# Patient Record
Sex: FEMALE | Race: WHITE | Employment: FULL TIME | URBAN - METROPOLITAN AREA
[De-identification: names, ages, dates, MRNs, and addresses within clinical notes are randomized per-mention and may not be internally consistent; named-entity substitution may affect disease eponyms.]

---

## 2017-08-04 ENCOUNTER — HOSPITAL ENCOUNTER (OUTPATIENT)
Dept: NUTRITION | Age: 42
Discharge: HOME | End: 2017-08-04
Payer: COMMERCIAL

## 2017-08-04 PROCEDURE — 97802 MEDICAL NUTRITION INDIV IN: CPT

## 2017-08-04 NOTE — PROGRESS NOTES
Kemi Biswas [facility]  [address] [facility], [place], [place]  Phone: [phone]  Fax: [phone]   Nutrition Assessment  Medical Nutrition Therapy   Outpatient Initial Evaluation         Patient Name: Daniela Michelle : 1975   Treatment Diagnosis: Wt gain Onset Date: Since child birth   Referral Source: Stevenson Snow MD Start of [facility], [place], operated [facility]): 2017     Ht:  64 in  cm Wt:  153.4 lb  kg   BMI: 26.3  BMR   male  BMR female 1341     PMHx includes: Wt gain, HLD, depression, anxiety     Medications of Nutritional Significance:   Welbutrin     Subjective/Assessment:   41 yo female referred for help with wt loss. Pt reports trying 10+ fad diets in the past and wt fluctuating from 130-163#. She's feeling hopeless regarding wt loss and has stopped weighing herself because it was becoming defeating. She enjoys lifting wts; currently 3x/wk (goal: 3-5x/wk) but does not like to go to the gym by herself. She realizes that wt may not decrease as much when building muscle. She has time to walk, when her daughters are at [employer] practice, but she wasn't sure if that was helpful. Educated pt on balance and moderation, adjusted her MFP settings (she states she's obsessed with it and logs everything she puts into her mouth). Pt may have orthorexic tendencies due to dieting history & is still potentially restricting carbohydrates more than necessary. She is motivated to find a strategy that works long term for gradual wt loss, she verbalized understanding recommendations and compliance is expected. Current Eating Patterns: Has not been following any specific diets for 1-2 mo; focusing on high protein and not weighing herself prior to apmt.  Uses MFP was at 6233-1981 calories 50% pro, 25% fat - decreased in office to 7991-3387 & 30/40/30%    7am: Protein shake 25g pro, 4g carb, 1.5g fat  8am: B - 2 boiled eggs, avocado or oatmeal  10am: S - nuts  11:30am: L - leftovers (meat, starch, veg)  2pm: S - sometimes (rice cake and PB)  4:30pm: D - chicken, rice, veg  Workout  Protein shake 25g pro, 4g carb, 1.5g fat  Sometimes will make popcorn (weekends)     Handouts/  Information Provided: [x]  Carbohydrates  [x]  Protein  []  Fiber  []  Serving Sizes  [x]  Meal and Snack Ideas  []  Food Journals []  Diabetes  []  Cholesterol  []  Sodium  []  Gen Nutr Guidelines  []  SBGM Guidelines  [x]  Others: Hunger scale, balanced plate model, meal timing guidelines   Estimate Needs:   Calories: 8939-7158 Protein:  Carbs: 150-170 Fat: 50   Kcal/day  g/day  g/day  g/day        percent: 25-30  40-45  30     Nutritional Goal - To promote lifestyle changes to result in:    [x]  Weight loss  []  Improved diabetic control  []  Decreased cholesterol levels  []  Decreased blood pressure  []  Weight maintenance []  Preventing any interactions associated with food allergies  []  Adequate weight gain toward goal weight  []  Other:      Recommendations: 1. Start increasing portion size and frequency of vegetable intake. 2. Pay more attention to hunger and fullness signals. 3. Become more mindful of overall balance and start including a carbohydrate source after lifting. 4. Try to pair protein with carbohydrates. Information Reviewed with: pt   Potential Barriers to Learning:  Many previous dieting rules are still engraved in her mind; it may take time to retrain the way she thinks about food     Dietitian Signature: Harvinder Weeks MS, RD Date: 8/4/2017   Follow-up: Mon. 9/25/17 at 9:30a Time: 11:36 AM

## 2017-09-25 ENCOUNTER — HOSPITAL ENCOUNTER (OUTPATIENT)
Dept: NUTRITION | Age: 42
Discharge: HOME | End: 2017-09-25
Payer: COMMERCIAL

## 2017-09-25 PROCEDURE — 97803 MED NUTRITION INDIV SUBSEQ: CPT

## 2017-09-25 NOTE — PROGRESS NOTES
NUTRITION  DAILY TREATMENT NOTE  Patient Name: Krishna Moreno         Date: 2017  : 1975    YES Patient  Verified  Insurance: Payor: BLUE CROSS / Plan: [place] [employer] / Product Type: PPO /    Diagnosis: In time:   930             Out time:   1000   Total Treatment Time (min):   30     SUBJECTIVE    Medication Changes/New allergies or changes in medical history, any new surgeries or procedures? NO    If yes, update Summary List   Subjective Functional Status/Changes:  []  No changes reported   Pt has been doing okay since last visit. She states she does really good during the week when she has structure and routine, but things kind of fall apart (diet wise) on the weekends. She is \"addicted\" to MFP and logs everything, adjusted her macros and calories to give er a better balance to elisit fat burn. Pt prefers weight training, but has been unable to get to the gym more than 1-2 times per week lately. She does walk 3-5 miles when at [facility]. Provided her with other exercises she can do to create a circuit workout when she cannot make it to the gym. Discussed meal prep on weekends or week days she has the time in order to allow for healthier choices. Also discussed comparison photos since she feels she is not seeing any change with her body comp. Pt does have some high expectations and continued to reassure that it takes time. Anticipate compliance.     Current Wt: 155.2 Previous Wt: 153.4 Wt Change: +2.8     OBJECTIVE    Patient Education:  [x]  Review current plan with patient   []  Other:    Handouts/  Information Provided: []  Carbohydrates  []  Protein  []  Fiber  []  Serving Sizes  []  Fluids  []  General guidelines []  Diabetes  []  Cholesterol  []  Sodium  []  SBGM  []  Food Journals  []  Others:    Estimated Needs: 9638 164 313 95    Calories Protein CHO Fat     ASSESSMENT    []  Pt Progressing Well  [x]  Slow Progress []  No Progress    Other:    [x]  Understand Dietary Changes/Recs [x]  No Change []  Improving []  N/A   [x]  Weight [x]  No Change []  Improving []  N/A     Glucose Levels []  No Change []  Improving []  N/A   []  Cholesterol Levels []  No Change []  Improving []  N/A     RECOMMENDATIONS    1. Use crock pot or meal prep on days that work best for your schedule; at least make meat/veggies   2. Exercise ideas: tababata timer, sparticus workout   3. Adjusted nutrient breakdown slightly to better balance carb/protein ratio   4.    5.       PLAN    [x]  Continue on current plan []  Follow-up PRN   []  Discharge due to :    [x]  Next Appt[de-identified] 27 OCt @ 4502     Dietitian: Junior Mead MA, RD    Date: 9/25/2017 Time: 9:37 AM

## 2017-10-27 ENCOUNTER — APPOINTMENT (OUTPATIENT)
Dept: NUTRITION | Age: 42
End: 2017-10-27

## 2017-12-04 ENCOUNTER — HOSPITAL ENCOUNTER (OUTPATIENT)
Dept: NUTRITION | Age: 42
Discharge: HOME | End: 2017-12-04
Payer: COMMERCIAL

## 2017-12-04 PROCEDURE — 97803 MED NUTRITION INDIV SUBSEQ: CPT

## 2017-12-04 NOTE — PROGRESS NOTES
NUTRITION  FOLLOW-UP TREATMENT NOTE  Patient Name: Ana Pro         Date: 2017  : 1975    YES Patient  Verified  Diagnosis: Weight loss    In time:  1500            Out time:   1530   Total Treatment Time (min):   30     SUBJECTIVE/ASSESSMENT  Changes in medication or medical history? Any new allergies, surgeries or procedures? NO    If yes, update Summary List   Pt states it was a rough two months and she fell off track in her exercise, healthy eating, and recording in MFP. She wants to be healthy and get back on track. Discussed obstacles and ways to keep motivated during fat loss goals. She continues to have high expectations and needed reassurance that it takes time for changes to occur. Reviewed calorie/macro ranges. Pt will benefit from follow up in keeping her accountable. Current Wt: 160.2 Previous Wt: 155. 2 Wt Change: +5 lbs      Achievement of Goals:  1. Use crock pot or meal prep on days that work best for your schedule; at least make meat/veggies. 2. Exercise ideas: tababata timer, sparticus workout  3. Adjusted nutrient breakdown slightly to better balance carb/protein ratio  New Patient Goals:  1. Exercise 3-5 days/week for 20-30 min cardio, include HIIT or circuit training  2. Remain consistent in meal timing and eating balanced meals during the week and weekend. 3. Continue tracking calories on MFP. Patient Education:  [x]  Review current plan with patient   []  Other:    Handouts/  Information Provided: []  Carbohydrates  []  Protein  []  Fiber  []  Serving Sizes  []  Fluids  []  General guidelines []  Diabetes  []  Cholesterol  []  Sodium  []  SBGM  []  Food Journals  []  Others:      PLAN  [x]  Continue on current plan []  Follow-up PRN   []  Discharge due to :    [x]  Next appt:   5 at 0930     Dietitian: Sam Khanna RDN    Date: 2017 Time: 3:46 PM

## 2018-02-05 ENCOUNTER — HOSPITAL ENCOUNTER (OUTPATIENT)
Dept: NUTRITION | Age: 43
Discharge: HOME | End: 2018-02-05
Payer: COMMERCIAL

## 2018-02-05 PROCEDURE — 97803 MED NUTRITION INDIV SUBSEQ: CPT

## 2018-02-05 NOTE — PROGRESS NOTES
NUTRITION  FOLLOW-UP TREATMENT NOTE  Patient Name: Kj Titus         Date: 2018  : 1975    YES Patient  Verified  Diagnosis: Overweight    In time:   0930             Out time:   1000   Total Treatment Time (min):   30     SUBJECTIVE/ASSESSMENT  Changes in medication or medical history? Any new allergies, surgeries or procedures? NO    If yes, update Summary List   Pt doing well since last visit and being more consistent with tracking in MFP and weight lifting 3-5 times/week. She is training for a fitness competition in May. For the past 3 weeks, pt has cut out meat from her diet (pork, beef, chicken, turkey), states she's experimenting with her body and feels good. She is including seafood, eggs, cheese, beans, and rice protein powder. She finds it challenging to balance her CHO:protein, finding it easier to consume more CHO, especially with her vegetarian protein foods (soy, beans). Pt remains hyper-focused on her food intake and meeting her macro goals. Discussed having a healthy relationship with food versus having strict guidelines, whereas pt feels shame if these aren't met. Will continue to follow after two months as pt has need for accountability. Current Wt: 165.2 Previous Wt: 160.2 Wt Change: +5     Achievement of Goals: 1. Exercise 3-5 days/week for 20-30 min cardio, include HIIT or circuit training=continue  2. Remain consistent in meal timing and eating balanced meals during the week and weekend.=continue  3. Continue tracking calories on MFP.=continue  New Patient Goals:  1. Add in vegetables at lunch/dinner.      Patient Education:  [x]  Review current plan with patient   []  Other:    Handouts/  Information Provided: []  Carbohydrates  []  Protein  []  Fiber  []  Serving Sizes  []  Fluids  []  General guidelines []  Diabetes  []  Cholesterol  []  Sodium  []  SBGM  []  Food Journals  []  Others:      PLAN  []  Continue on current plan []  Follow-up PRN   []  Discharge due to : [x]  Next appt: April 20 at 1500     Dietitian: Jeramy Lemons RDN    Date: 2/5/2018 Time: 12:30 PM

## 2018-04-20 ENCOUNTER — HOSPITAL ENCOUNTER (OUTPATIENT)
Dept: NUTRITION | Age: 43
Discharge: HOME | End: 2018-04-20
Payer: COMMERCIAL

## 2018-04-20 PROCEDURE — 97803 MED NUTRITION INDIV SUBSEQ: CPT

## 2018-04-20 NOTE — PROGRESS NOTES
NUTRITION  FOLLOW-UP TREATMENT NOTE  Patient Name: Bro Dobson         Date: 2018  : 1975    YES Patient  Verified  Diagnosis: Overweight   In time:   1500             Out time:   1530   Total Treatment Time (min):   30     SUBJECTIVE/ASSESSMENT  Changes in medication or medical history? Any new allergies, surgeries or procedures? NO    If yes, update Summary List    Pt reports continuing to track her food/fluid intake in [facility]. She is focusing on weight training, but does incorporate aerobic activity during her workouts. Her main goals are to build muscle and burn body fat, particularly around her mid-section. She is frustrated by her lack of noticeable body changes and thinks she should continue to cut calories. Discussed caloric range to meet nutrient needs and build muscle without causing nutrient deficiency. Per Pt's physical fitness goals, she may be better served by meeting with one of our sports dietitians. Will follow up with fellow dietitian and Pt. Current Wt: 160.6 Previous Wt: 165.2 Wt Change: -5     Achievement of Goals: 1. Add in vegetables at lunch/dinner.=met  New Patient Goals:  1. Continue to track food intake in [facility].       Patient Education:  [x]  Review current plan with patient   []  Other:    Handouts/  Information Provided: []  Carbohydrates  []  Protein  []  Fiber  []  Serving Sizes  []  Fluids  []  General guidelines []  Diabetes  []  Cholesterol  []  Sodium  []  SBGM  []  Food Journals  []  Others:      PLAN  []  Continue on current plan [x]  Follow-up PRN   []  Discharge due to :    []  Next appt:      Dietitian: Brittany Nathan RDN    Date: 2018 Time: 3:53 PM

## 2018-04-23 ENCOUNTER — OFFICE VISIT (OUTPATIENT)
Dept: ORTHOPEDIC SURGERY | Age: 43
End: 2018-04-23

## 2018-04-23 VITALS
SYSTOLIC BLOOD PRESSURE: 115 MMHG | OXYGEN SATURATION: 98 % | DIASTOLIC BLOOD PRESSURE: 76 MMHG | BODY MASS INDEX: 27.72 KG/M2 | HEART RATE: 81 BPM | HEIGHT: 64 IN | WEIGHT: 162.4 LBS

## 2018-04-23 DIAGNOSIS — M72.2 PLANTAR FASCIITIS: ICD-10-CM

## 2018-04-23 DIAGNOSIS — M79.671 ARCH PAIN OF RIGHT FOOT: Primary | ICD-10-CM

## 2018-04-23 RX ORDER — MELOXICAM 15 MG/1
TABLET ORAL
Qty: 30 TAB | Refills: 1 | Status: SHIPPED | OUTPATIENT
Start: 2018-04-23

## 2018-04-23 RX ORDER — FAMOTIDINE 20 MG/1
20 TABLET, FILM COATED ORAL 2 TIMES DAILY
Qty: 60 TAB | Refills: 4 | Status: CANCELLED | OUTPATIENT
Start: 2018-04-23 | End: 2018-09-20

## 2018-04-23 RX ORDER — OMEPRAZOLE 40 MG/1
40 CAPSULE, DELAYED RELEASE ORAL DAILY
Qty: 30 CAP | Refills: 2 | Status: SHIPPED | OUTPATIENT
Start: 2018-04-23

## 2018-04-23 RX ORDER — METHYLPREDNISOLONE 4 MG/1
TABLET ORAL
Qty: 1 DOSE PACK | Refills: 0 | Status: SHIPPED | OUTPATIENT
Start: 2018-04-23

## 2018-04-23 NOTE — PATIENT INSTRUCTIONS
Rx has been provided for Medrol Dose Pack - Please take this medication FIRST until completed. Once the you complete the Medrol Dose, skip one day and start taking Mobic 15 mg tabs, one tab per day. Please take with Prilosec  Dorsal night splint provided  Order provided for PT  Please completed stretching exercises  Roll right foot with frozen water bottle  Follow up in 2 weeks or sooner as needed         Plantar Fasciitis: Care Instructions  Your Care Instructions    Plantar fasciitis is pain and inflammation of the plantar fascia, the tissue at the bottom of your foot that connects the heel bone to the toes. The plantar fascia also supports the arch. If you strain the plantar fascia, it can develop small tears and cause heel pain when you stand or walk. Plantar fasciitis can be caused by running or other sports. It also may occur in people who are overweight or who have high arches or flat feet. You may get plantar fasciitis if you walk or stand for long periods, or have a tight Achilles tendon or calf muscles. You can improve your foot pain with rest and other care at home. It might take a few weeks to a few months for your foot to heal completely. Follow-up care is a key part of your treatment and safety. Be sure to make and go to all appointments, and call your doctor if you are having problems. It's also a good idea to know your test results and keep a list of the medicines you take. How can you care for yourself at home? · Rest your feet often. Reduce your activity to a level that lets you avoid pain. If possible, do not run or walk on hard surfaces. · Take pain medicines exactly as directed. ¨ If the doctor gave you a prescription medicine for pain, take it as prescribed. ¨ If you are not taking a prescription pain medicine, take an over-the-counter anti-inflammatory medicine for pain and swelling, such as ibuprofen (Advil, Motrin) or naproxen (Aleve).  Read and follow all instructions on the label. · Use ice massage to help with pain and swelling. You can use an ice cube or an ice cup several times a day. To make an ice cup, fill a paper cup with water and freeze it. Cut off the top of the cup until a half-inch of ice shows. Hold onto the remaining paper to use the cup. Rub the ice in small circles over the area for 5 to 7 minutes. · Contrast baths, which alternate hot and cold water, can also help reduce swelling. But because heat alone may make pain and swelling worse, end a contrast bath with a soak in cold water. · Wear a night splint if your doctor suggests it. A night splint holds your foot with the toes pointed up and the foot and ankle at a 90-degree angle. This position gives the bottom of your foot a constant, gentle stretch. · Do simple exercises such as calf stretches and towel stretches 2 to 3 times each day, especially when you first get up in the morning. These can help the plantar fascia become more flexible. They also make the muscles that support your arch stronger. Hold these stretches for 15 to 30 seconds per stretch. Repeat 2 to 4 times. ¨ Stand about 1 foot from a wall. Place the palms of both hands against the wall at chest level. Lean forward against the wall, keeping one leg with the knee straight and heel on the ground while bending the knee of the other leg. ¨ Sit down on the floor or a mat with your feet stretched in front of you. Roll up a towel lengthwise, and loop it over the ball of your foot. Holding the towel at both ends, gently pull the towel toward you to stretch your foot. · Wear shoes with good arch support. Athletic shoes or shoes with a well-cushioned sole are good choices. · Try heel cups or shoe inserts (orthotics) to help cushion your heel. You can buy these at many shoe stores. · Put on your shoes as soon as you get out of bed. Going barefoot or wearing slippers may make your pain worse. · Reach and stay at a good weight for your height.  This puts less strain on your feet. When should you call for help? Call your doctor now or seek immediate medical care if:  · You have heel pain with fever, redness, or warmth in your heel. · You cannot put weight on the sore foot. Watch closely for changes in your health, and be sure to contact your doctor if:  · You have numbness or tingling in your heel. · Your heel pain lasts more than 2 weeks. Where can you learn more? Go to http://siobhan-eliseo.info/. Ailyn Tran in the search box to learn more about \"Plantar Fasciitis: Care Instructions. \"  Current as of: March 21, 2017  Content Version: 11.4  © 3246-9367 [employer]. Care instructions adapted under license by [employer] (which disclaims liability or warranty for this information). If you have questions about a medical condition or this instruction, always ask your healthcare professional. [place] [address] any warranty or liability for your use of this information. Plantar Fasciitis: Exercises  Your Care Instructions  Here are some examples of typical rehabilitation exercises for your condition. Start each exercise slowly. Ease off the exercise if you start to have pain. Your doctor or physical therapist will tell you when you can start these exercises and which ones will work best for you. How to do the exercises  Towel stretch    1. Sit with your legs extended and knees straight. 2. Place a towel around your foot just under the toes. 3. Hold each end of the towel in each hand, with your hands above your knees. 4. Pull back with the towel so that your foot stretches toward you. 5. Hold the position for at least 15 to 30 seconds. 6. Repeat 2 to 4 times a session, up to 5 sessions a day. Calf stretch    This exercise stretches the muscles at the back of the lower leg (the calf) and the Achilles tendon. Do this exercise 3 or 4 times a day, 5 days a week.   1. Stand facing a wall with your hands on the wall at about eye level. Put the leg you want to stretch about a step behind your other leg. 2. Keeping your back heel on the floor, bend your front knee until you feel a stretch in the back leg. 3. Hold the stretch for 15 to 30 seconds. Repeat 2 to 4 times. Plantar fascia and calf stretch    Stretching the plantar fascia and calf muscles can increase flexibility and decrease heel pain. You can do this exercise several times each day and before and after activity. 1. Stand on a step as shown above. Be sure to hold on to the banister. 2. Slowly let your heels down over the edge of the step as you relax your calf muscles. You should feel a gentle stretch across the bottom of your foot and up the back of your leg to your knee. 3. Hold the stretch about 15 to 30 seconds, and then tighten your calf muscle a little to bring your heel back up to the level of the step. Repeat 2 to 4 times. Towel curls    Make this exercise more challenging by placing a weighted object, such as a soup can, on the other end of the towel. 1. While sitting, place your foot on a towel on the floor and scrunch the towel toward you with your toes. 2. Then, also using your toes, push the towel away from you. [place] pickups    1. Put marbles on the floor next to a cup.  2. Using your toes, try to lift the marbles up from the floor and put them in the cup. Follow-up care is a key part of your treatment and safety. Be sure to make and go to all appointments, and call your doctor if you are having problems. It's also a good idea to know your test results and keep a list of the medicines you take. Where can you learn more? Go to http://siobhan-eliseo.info/. Deloris Rashid in the search box to learn more about \"Plantar Fasciitis: Exercises. \"  Current as of: March 21, 2017  Content Version: 11.4  © 0156-7308 [employer].  Care instructions adapted under license by [employer] (which disclaims liability or warranty for this information). If you have questions about a medical condition or this instruction, always ask your healthcare professional. [address] any warranty or liability for your use of this information.

## 2018-04-23 NOTE — MR AVS SNAPSHOT
[address] [place] [address], [address] 200 [facility] 
[phone] Patient: Daniela Michelle MRN: [id number] :1975 Visit Information Date & Time Provider Department Dept. Phone Encounter #  
 2018  2:45 PM Obdulio Gonzalez, [address] [place] [address] [facility] [phone] [id number] Follow-up Instructions Return in about 2 weeks (around 2018) for follow up evaluation. Upcoming Health Maintenance Date Due DTaP/Tdap/Td series (1 - Tdap) 10/26/1995 PAP AKA CERVICAL CYTOLOGY 10/26/1995 Influenza Age 5 to Adult 2017 Allergies as of 2018  Review Complete On: 2018 By: Rita Lozano PA-C No Known Allergies Current Immunizations  Never Reviewed No immunizations on file. Not reviewed this visit You Were Diagnosed With   
  
 Codes Comments Arch pain of right foot    -  Primary ICD-10-CM: P57.407 ICD-9-CM: 729.5 Plantar fasciitis     ICD-10-CM: M72.2 ICD-9-CM: 728.71 Vitals BP Pulse Height(growth percentile) Weight(growth percentile) SpO2 BMI  
 115/76 (BP 1 Location: Left arm, BP Patient Position: Sitting) 81 5' 4\" (1.626 m) 162 lb 6.4 oz (73.7 kg) 98% 27.88 kg/m2 OB Status Smoking Status Having regular periods Never Smoker Vitals History BMI and BSA Data Body Mass Index Body Surface Area  
 27.88 kg/m 2 1.82 m 2 Preferred Pharmacy Pharmacy Name Phone [facility] [address], [address] [place] [address] [phone] Your Updated Medication List  
  
   
This list is accurate as of 18  3:32 PM.  Always use your most recent med list.  
  
  
  
  
 cyclobenzaprine 10 mg tablet Commonly known as:  FLEXERIL Take 0.5 Tabs by mouth three (3) times daily as needed for Muscle Spasm(s). FISH OIL 1,000 mg Cap Generic drug:  omega-3 fatty acids-vitamin e Take  by mouth. MULTIVITAMIN PO Take  by mouth. naproxen 500 mg tablet Commonly known as:  NAPROSYN Take 1 Tab by mouth two (2) times daily (with meals). VITAMIN D3 1,000 unit tablet Generic drug:  cholecalciferol Take  by mouth daily. We Performed the Following AMB POC XRAY, FOOT; COMPLETE, 3+ VIEW [84530 CPT(R)] AMB SUPPLY ORDER [[id number] Custom] Comments:  
 Dorsal night splint REFERRAL TO PHYSICAL THERAPY [SZT27 Custom] Comments:  
 Right plantar fasciitis 
eval and treat to include modalities 2-3X a week X 3-4 weeks Follow-up Instructions Return in about 2 weeks (around 5/7/2018) for follow up evaluation. Referral Information Referral ID Referred By Referred To  
  
 [id number] MYKEL DENNIS [address]   
   [address] [place] [address] [address] Phone: [phone] Fax: [phone] Visits Status Start Date End Date 1 New Request 4/23/18 4/23/19 If your referral has a status of pending review or denied, additional information will be sent to support the outcome of this decision. Patient Instructions Rx has been provided for Medrol Dose Pack - Please take this medication FIRST until completed. Once the you complete the Medrol Dose, skip one day and start taking Mobic 15 mg tabs, one tab per day. Please take with Prilosec Dorsal night splint provided Order provided for PT Please completed stretching exercises Roll right foot with frozen water bottle Follow up in 2 weeks or sooner as needed Plantar Fasciitis: Care Instructions Your Care Instructions Plantar fasciitis is pain and inflammation of the plantar fascia, the tissue at the bottom of your foot that connects the heel bone to the toes. The plantar fascia also supports the arch. If you strain the plantar fascia, it can develop small tears and cause heel pain when you stand or walk. Plantar fasciitis can be caused by running or other sports. It also may occur in people who are overweight or who have high arches or flat feet. You may get plantar fasciitis if you walk or stand for long periods, or have a tight Achilles tendon or calf muscles. You can improve your foot pain with rest and other care at home. It might take a few weeks to a few months for your foot to heal completely. Follow-up care is a key part of your treatment and safety. Be sure to make and go to all appointments, and call your doctor if you are having problems. It's also a good idea to know your test results and keep a list of the medicines you take. How can you care for yourself at home? · Rest your feet often. Reduce your activity to a level that lets you avoid pain. If possible, do not run or walk on hard surfaces. · Take pain medicines exactly as directed. ¨ If the doctor gave you a prescription medicine for pain, take it as prescribed. ¨ If you are not taking a prescription pain medicine, take an over-the-counter anti-inflammatory medicine for pain and swelling, such as ibuprofen (Advil, Motrin) or naproxen (Aleve). Read and follow all instructions on the label. · Use ice massage to help with pain and swelling. You can use an ice cube or an ice cup several times a day. To make an ice cup, fill a paper cup with water and freeze it. Cut off the top of the cup until a half-inch of ice shows. Hold onto the remaining paper to use the cup. Rub the ice in small circles over the area for 5 to 7 minutes. · Contrast baths, which alternate hot and cold water, can also help reduce swelling. But because heat alone may make pain and swelling worse, end a contrast bath with a soak in cold water. · Wear a night splint if your doctor suggests it. A night splint holds your foot with the toes pointed up and the foot and ankle at a 90-degree angle. This position gives the bottom of your foot a constant, gentle stretch. · Do simple exercises such as calf stretches and towel stretches 2 to 3 times each day, especially when you first get up in the morning. These can help the plantar fascia become more flexible. They also make the muscles that support your arch stronger. Hold these stretches for 15 to 30 seconds per stretch. Repeat 2 to 4 times. ¨ Stand about 1 foot from a wall. Place the palms of both hands against the wall at chest level. Lean forward against the wall, keeping one leg with the knee straight and heel on the ground while bending the knee of the other leg. ¨ Sit down on the floor or a mat with your feet stretched in front of you. Roll up a towel lengthwise, and loop it over the ball of your foot. Holding the towel at both ends, gently pull the towel toward you to stretch your foot. · Wear shoes with good arch support. Athletic shoes or shoes with a well-cushioned sole are good choices. · Try heel cups or shoe inserts (orthotics) to help cushion your heel. You can buy these at many shoe stores. · Put on your shoes as soon as you get out of bed. Going barefoot or wearing slippers may make your pain worse. · Reach and stay at a good weight for your height. This puts less strain on your feet. When should you call for help? Call your doctor now or seek immediate medical care if: 
· You have heel pain with fever, redness, or warmth in your heel. · You cannot put weight on the sore foot. Watch closely for changes in your health, and be sure to contact your doctor if: 
· You have numbness or tingling in your heel. · Your heel pain lasts more than 2 weeks. Where can you learn more? Go to http://siobhan-eliseo.info/. Yoana Dominique in the search box to learn more about \"Plantar Fasciitis: Care Instructions. \" Current as of: March 21, 2017 Content Version: 11.4 © 8466-0796 Vigno.  Care instructions adapted under license by [employer] (which disclaims liability or warranty for this information). If you have questions about a medical condition or this instruction, always ask your healthcare professional. [address] any warranty or liability for your use of this information. Plantar Fasciitis: Exercises Your Care Instructions Here are some examples of typical rehabilitation exercises for your condition. Start each exercise slowly. Ease off the exercise if you start to have pain. Your doctor or physical therapist will tell you when you can start these exercises and which ones will work best for you. How to do the exercises Towel stretch 1. Sit with your legs extended and knees straight. 2. Place a towel around your foot just under the toes. 3. Hold each end of the towel in each hand, with your hands above your knees. 4. Pull back with the towel so that your foot stretches toward you. 5. Hold the position for at least 15 to 30 seconds. 6. Repeat 2 to 4 times a session, up to 5 sessions a day. Calf stretch This exercise stretches the muscles at the back of the lower leg (the calf) and the Achilles tendon. Do this exercise 3 or 4 times a day, 5 days a week. 1. Stand facing a wall with your hands on the wall at about eye level. Put the leg you want to stretch about a step behind your other leg. 2. Keeping your back heel on the floor, bend your front knee until you feel a stretch in the back leg. 3. Hold the stretch for 15 to 30 seconds. Repeat 2 to 4 times. Plantar fascia and calf stretch Stretching the plantar fascia and calf muscles can increase flexibility and decrease heel pain. You can do this exercise several times each day and before and after activity. 1. Stand on a step as shown above. Be sure to hold on to the banister. 2. Slowly let your heels down over the edge of the step as you relax your calf muscles. You should feel a gentle stretch across the bottom of your foot and up the back of your leg to your knee. 3. Hold the stretch about 15 to 30 seconds, and then tighten your calf muscle a little to bring your heel back up to the level of the step. Repeat 2 to 4 times. Towel curls Make this exercise more challenging by placing a weighted object, such as a soup can, on the other end of the towel. 1. While sitting, place your foot on a towel on the floor and scrunch the towel toward you with your toes. 2. Then, also using your toes, push the towel away from you. [place] pickups 1. Put marbles on the floor next to a cup. 
2. Using your toes, try to lift the marbles up from the floor and put them in the cup. Follow-up care is a key part of your treatment and safety. Be sure to make and go to all appointments, and call your doctor if you are having problems. It's also a good idea to know your test results and keep a list of the medicines you take. Where can you learn more? Go to http://siobhan-eliseo.info/. Kiera Barba in the search box to learn more about \"Plantar Fasciitis: Exercises. \" Current as of: March 21, 2017 Content Version: 11.4 © 2764-6482 Healthwise, [employer]. Care instructions adapted under license by [employer] (which disclaims liability or warranty for this information). If you have questions about a medical condition or this instruction, always ask your healthcare professional. [place] [address] any warranty or liability for your use of this information. Introducing [facility]! [address] introduces Ziploop patient portal. Now you can access parts of your medical record, email your doctor's office, and request medication refills online. 1. In your internet browser, go to [email or web address] 2. Click on the First Time User? Click Here link in the Sign In box. You will see the New Member Sign Up page. 3. Enter your Ziploop Access Code exactly as it appears below.  You will not need to use this code after youve completed the sign-up process. If you do not sign up before the expiration date, you must request a new code. · [employer] Access Code: [email or web address] Expires: 7/12/2018 11:17 AM 
 
4. Enter the last four digits of your Social Security Number (xxxx) and Date of Birth (mm/dd/yyyy) as indicated and click Submit. You will be taken to the next sign-up page. 5. Create a Breezeworks ID. This will be your Breezeworks login ID and cannot be changed, so think of one that is secure and easy to remember. 6. Create a B[employer]orks password. You can change your password at any time. 7. Enter your Password Reset Question and Answer. This can be used at a later time if you forget your password. 8. Enter your e-mail address. You will receive e-mail notification when new information is available in [address]. 9. Click Sign Up. You can now view and download portions of your medical record. 10. Click the Download Summary menu link to download a portable copy of your medical information. If you have questions, please visit the Frequently Asked Questions section of the Breezeworks website. Remember, Breezeworks is NOT to be used for urgent needs. For medical emergencies, dial 911. Now available from your iPhone and Android! Please provide this summary of care documentation to your next provider. Your primary care clinician is listed as Heidi Summers. If you have any questions after today's visit, please call [phone].

## 2018-04-23 NOTE — PROCEDURES
X-rays of the right foot reveal no fracture, dislocation or subluxation. No osteolytic or osteoblastic lesion noted. No calcaneal spur. Some sclerosis noted to the posterior calcaneus in the area of Achilles'  Insertion.

## 2018-04-23 NOTE — PROGRESS NOTES
AMBULATORY PROGRESS NOTE      Patient: Miles Benjamin             MRN: [id number]     SSN: [id number] Body mass index is 27.88 kg/(m^2). YOB: 1975     AGE: 36 y.o. SEX: female    PCP: Willian Lazcano MD    IMPRESSION/DIAGNOSIS AND TREATMENT PLAN     DIAGNOSES  1. Arch pain of right foot    2. Plantar fasciitis        Orders Placed This Encounter    AMB SUPPLY ORDER    [54880] Foot Min 3V    REFERRAL TO PHYSICAL THERAPY    methylPREDNISolone (MEDROL DOSEPACK) 4 mg tablet    meloxicam (MOBIC) 15 mg tablet    omeprazole (PRILOSEC) 40 mg capsule      Miles Benjamin understands her diagnoses and the proposed plan. Plan:  Rx has been provided for Medrol Dose Pack - Please take this medication FIRST until completed. Once the you complete the Medrol Dose, skip one day and start taking Mobic 15 mg tabs, one tab per day. Please take with Prilosec  Dorsal night splint provided  Order provided for PT  Please completed stretching exercises  Roll right foot with frozen water bottle  Follow up in 2 weeks or sooner as needed. Patient declined cortisone injection at this time. If no improvement at [facility], she will consider injection. Patient has been advised that there are mix results with surgical plantar fascia release procedure. HPI AND EXAMINATION     Miles Benjamin IS A 36 y.o. female who presents to my outpatient office for a problem visit involving right foot pain. At last visit, I treated for left Achilles' tendinitis and prescribed Mobic 15 mg, referred to PT, and provided a HEP. Patient presents today for a new problem of pain in the arch of her right foot that started several weeks ago. Patient states that she has recently increased her activity in preparation for a 5k race she is planning to run with her daughter.  She states that she has pain first thing in the morning when she first steps down and the pain returns when she stands after being seated for an extended period of time. She denies any history of injury or trauma to the right foot. She has not tried any treatment for her right plantar fascia pain. ANKLE/FOOT RIGHT     Psychiatry: Alert, Oriented x 3; Speech normal in context and clarity,                                 Memory intact grossly, no involuntary movements - tremors, no dementia  Gait: normal  Tenderness: moderate tenderness to plantar medial fascia, no pain to central heel or along Achilles tendon insertion  Cutaneous: WNL,   Joint Motion: WNL  Joint / Tendon Stability: No Ankle or Subtalar instability or joint laxity. No peroneal sublux ability or dislocation  Alignment: neutral Hindfoot, none Metatarsus Adductus Metatarsus. Neuro Motor/Sensory: NL/NL, Vascular: NL foot/ankle pulses  Lymphatics: No extremity lymphedema, No calf swelling, no tenderness to calf muscles. CHART REVIEW     Past Medical History:   Diagnosis Date    Acute iritis 10/2010    right eye    Anemia NEC 1995    with pregnancy only    Bell's palsy 1/14/2011    Pain in the neck 10/27/2010     Current Outpatient Prescriptions   Medication Sig    methylPREDNISolone (MEDROL DOSEPACK) 4 mg tablet Per dose pack instructions    meloxicam (MOBIC) 15 mg tablet Take one tablet by mouth daily with food. START THIS MEDICATION AFTER COMPLETING THE MEDROL DOSE PACK    omeprazole (PRILOSEC) 40 mg capsule Take 1 Cap by mouth daily.  cyclobenzaprine (FLEXERIL) 10 mg tablet Take 0.5 Tabs by mouth three (3) times daily as needed for Muscle Spasm(s).  MULTIVITAMIN PO Take  by mouth.  cholecalciferol, vitamin d3, (VITAMIN D) 1,000 unit tablet Take  by mouth daily.  omega-3 fatty acids-vitamin e (FISH OIL) 1,000 mg Cap Take  by mouth. No current facility-administered medications for this visit.       No Known Allergies  Past Surgical History:   Procedure Laterality Date    HX HEENT      gum grafting     Social History Occupational History    Not on file. Social History Main Topics    Smoking status: Never Smoker    Smokeless tobacco: Never Used    Alcohol use Yes      Comment: rare    Drug use: No    Sexual activity: Yes     Partners: Male     Birth control/ protection: Condom     Family History   Problem Relation Age of Onset    Diabetes Father     Hypertension Maternal Grandmother     Elevated Lipids Maternal Grandmother     Heart Disease Maternal Grandmother     Stroke Maternal Grandmother        REVIEW OF SYSTEMS : 4/23/2018  ALL BELOW ARE Negative except : SEE HPI       Constitutional: Negative for fever, chills and weight loss. Neg Weigh Loss  Cardiovascular: Negative for chest pain, claudication and leg swelling. SOB, BROWN   Gastrointestinal: Negative for  pain, N/V/D/C, Blood in stool or urine,dysuria, hematuria,        Incontinence, pelvic pain  Musculoskeletal: see HPI. Neurological: Negative for dizziness and weakness. Negative for headaches,Visual Changes, Confusion, Seizures,   Psychiatric/Behavioral: Negative for depression, memory loss and substance abuse. Extremities:  Negative for  hair changes, rash or skin lesion changes. Hematologic: Negative for Bleeding problems, bruising, pallor or swollen lymph nodes. Peripheral Vascular: No calf pain, vascular vein tenderness to calf pain              No calf throbbing, posterior knee throbbing pain    DIAGNOSTIC IMAGING     X-rays of the right foot reveal no fracture, dislocation or subluxation. No osteolytic or osteoblastic lesion noted. No calcaneal spur. Some sclerosis noted to the posterior calcaneus in the area of Achilles'  Insertion.

## 2018-04-30 ENCOUNTER — HOSPITAL ENCOUNTER (OUTPATIENT)
Dept: PHYSICAL THERAPY | Age: 43
Discharge: HOME | End: 2018-04-30
Payer: COMMERCIAL

## 2018-04-30 PROCEDURE — 97110 THERAPEUTIC EXERCISES: CPT

## 2018-04-30 PROCEDURE — 97162 PT EVAL MOD COMPLEX 30 MIN: CPT

## 2018-04-30 NOTE — PROGRESS NOTES
[facility]  [address]  [place], [address]  [phone] fax    Plan of Care/ Statement of Necessity for Physical Therapy Services    Patient name: Javon Erickson Start of Care: 2018   Referral source: Syeda Singer MD : 1975    Medical Diagnosis: Pain in right foot [M79.671]  Plantar fascial fibromatosis [M72.2]   Onset Date:2 months    Treatment Diagnosis: Right foot pain   Prior Hospitalization: see medical history Provider#: [id number]   Medications: Verified on Patient summary List    Comorbidities: B heel spurs    Prior Level of Function: Pt reports chronic foot problems since her 20's, reports ability to work out without increased pain     The Plan of Care and following information is based on the information from the initial evaluation. Assessment/ key information: Pt is a 35 y/o F presenting with c/o right medial arch pain for the past 2 months. She reports initiating training for 5K and has progressively worsened to a near constant pain. Pt does have history of B heel spurs per her report. She is TTP along distal medial heel pad near plantar fascia border. TTP also extends proximally along posterior tib region. Pt reports that she feels her feet tend to collapse medially with standing activity from time to time. She demonstrates WNL ankle AROM at this time. Pt signs and symptoms appear consistent with possible posterior tib tendinopathy vs plantarfasciitis component. She would benefit from PT to improve quality of life and return to PLOF.      Evaluation Complexity History MEDIUM  Complexity : 1-2 comorbidities / personal factors will impact the outcome/ POC ; Examination MEDIUM Complexity : 3 Standardized tests and measures addressing body structure, function, activity limitation and / or participation in recreation  ;Presentation LOW Complexity : Stable, uncomplicated  ;Clinical Decision Making MEDIUM Complexity : FOTO score of 26-74  Overall Complexity Rating: MEDIUM  Problem List: pain affecting function, decrease ROM, decrease strength, decrease ADL/ functional abilitiies, decrease activity tolerance and decrease flexibility/ joint mobility   Treatment Plan may include any combination of the following: Therapeutic exercise, Therapeutic activities, Neuromuscular re-education, Physical agent/modality, Gait/balance training, Manual therapy, Patient education, Self Care training and Functional mobility training  Patient / Family readiness to learn indicated by: asking questions and trying to perform skills  Persons(s) to be included in education: patient (P)  Barriers to Learning/Limitations: None  Patient Goal (s): Pain to go away  Patient Self Reported Health Status: good  Rehabilitation Potential: good    Short Term Goals: To be accomplished in 2 weeks:  1. Pt will be I and compliant with HEP to promote self management of symptoms. 2. Pt will demonstrate SLS on right without collapse into pronation and void of pain increase for improved stability. Long Term Goals: To be accomplished in 4 weeks:  1. Pt will perform 10 SL squat void of pain increase demonstrating ability to maintain neutral foot for improved mechanics with running and exercise. 2. Pt will report >50% improvement in arch pain symptoms to improve quality of life. 3. Pt will report no difficulty walking two blocks for improved functional mobility. 4. Pt will perform 5 minutes of TM running without pain provocation to return to training. Frequency / Duration: Patient to be seen 2 times per week for 4 weeks.     Patient/ Caregiver education and instruction: Diagnosis, prognosis, self care, activity modification and exercises   [x]  Plan of care has been reviewed with LAZARO Jacobo DPT, CMTPT 4/30/2018 3:21 PM    ________________________________________________________________________    I certify that the above Therapy Services are being furnished while the patient is under my care. I agree with the treatment plan and certify that this therapy is necessary.     [de-identified] Signature:____________________  Date:____________Time: _________    Please sign and return to [facility] [address] [address] [address]  [place], [address]  [phone] fax

## 2018-04-30 NOTE — PROGRESS NOTES
PT DAILY TREATMENT NOTE     Patient Name: Robles Pod  Date:2018  : 1975  [x]  Patient  Verified  Payor: BLUE CROSS / Plan: [employer] [facility] / Product Type: PPO /    In time:2:32  Out time:3:14  Total Treatment Time (min): 42  Visit #: 1 of 8    Treatment Area: Pain in right foot [M79.671]  Plantar fascial fibromatosis [M72.2]    SUBJECTIVE  Pain Level (0-10 scale): 5  Any medication changes, allergies to medications, adverse drug reactions, diagnosis change, or new procedure performed?: [x] No    [] Yes (see summary sheet for update)  Subjective functional status/changes:   [] No changes reported  Pt reports medial arch pain especially in the morning    OBJECTIVE    24 min [x]Eval                  []Re-Eval       18 min Therapeutic Exercise:  [] See flow sheet :   Rationale: increase ROM, increase strength and increase proprioception to improve the patients ability to perform workouts with improved pain and stability            With   [] TE   [] TA   [] neuro   [] other: Patient Education: [x] Review HEP    [] Progressed/Changed HEP based on:   [] positioning   [] body mechanics   [] transfers   [] heat/ice application    [] other:      Other Objective/Functional Measures: see initial eval     Pain Level (0-10 scale) post treatment: 0    ASSESSMENT/Changes in Function: see POC    Patient will continue to benefit from skilled PT services to modify and progress therapeutic interventions, address functional mobility deficits, address ROM deficits, address strength deficits, analyze and address soft tissue restrictions, analyze and cue movement patterns and analyze and modify body mechanics/ergonomics to attain remaining goals. []  See Plan of Care  []  See progress note/recertification  []  See Discharge Summary         Progress towards goals / Updated goals:  Short Term Goals: To be accomplished in 2 weeks:  1.  Pt will be I and compliant with HEP to promote self management of symptoms. 2. Pt will demonstrate SLS on right without collapse into pronation and void of pain increase for improved stability. Long Term Goals: To be accomplished in 4 weeks:  1. Pt will perform 10 SL squat void of pain increase demonstrating ability to maintain neutral foot for improved mechanics with running and exercise. 2. Pt will report >50% improvement in arch pain symptoms to improve quality of life. 3. Pt will report no difficulty walking two blocks for improved functional mobility. 4. Pt will perform 5 minutes of TM running without pain provocation to return to training.     PLAN  []  Upgrade activities as tolerated     [x]  Continue plan of care  []  Update interventions per flow sheet       []  Discharge due to:_  []  Other:_      Trinidad Brown DPT, CMTPT 4/30/2018  4:51 PM    Future Appointments  Date Time Provider Marylou Hernandez   5/4/2018 2:30  [address] [place] [address]   5/7/2018 2:45 PM Julissa Ramires MD [place] [id number]   5/8/2018 2:30 PM GABRIEL Powers [facility]   5/11/2018 3:00 PM [id number] [facility]   5/17/2018 3:00 PM Melba Murdock [address] [facility]   5/22/2018 2:30 PM [id number] [facility]   5/24/2018 2:30 PM GABRIEL Powers [facility]   5/29/2018 3:00 PM [facility]

## 2018-05-04 ENCOUNTER — APPOINTMENT (OUTPATIENT)
Dept: PHYSICAL THERAPY | Age: 43
End: 2018-05-04

## 2018-05-08 ENCOUNTER — APPOINTMENT (OUTPATIENT)
Dept: PHYSICAL THERAPY | Age: 43
End: 2018-05-08

## 2018-05-11 ENCOUNTER — APPOINTMENT (OUTPATIENT)
Dept: PHYSICAL THERAPY | Age: 43
End: 2018-05-11

## 2018-05-11 NOTE — PROGRESS NOTES
[facility]  [address]  [place], [address]  [phone] fax    Physical Therapy Discharge Summary  Patient name: Nabil Zamora Start of Care: 2018   Referral source: Quinton Donnelly MD : 1975                          Medical Diagnosis: Pain in right foot [M79.671]  Plantar fascial fibromatosis [M72.2] Onset Date:2 months                          Treatment Diagnosis: Right foot pain   Prior Hospitalization: see medical history Provider#: [id number]   Medications: Verified on Patient summary List    Comorbidities: B heel spurs    Prior Level of Function: Pt reports chronic foot problems since her 20's, reports ability to work out without increased pain  Visits from Start of Care: 1    Missed Visits: 0  Reporting Period : 2018 to 2018      Summary of Care:  Short Term Goals: To be accomplished in 2 weeks:  1. Pt will be I and compliant with HEP to promote self management of symptoms. 2. Pt will demonstrate SLS on right without collapse into pronation and void of pain increase for improved stability. Long Term Goals: To be accomplished in 4 weeks:  1. Pt will perform 10 SL squat void of pain increase demonstrating ability to maintain neutral foot for improved mechanics with running and exercise. 2. Pt will report >50% improvement in arch pain symptoms to improve quality of life. 3. Pt will report no difficulty walking two blocks for improved functional mobility. 4. Pt will perform 5 minutes of TM running without pain provocation to return to training. ASSESSMENT/RECOMMENDATIONS: Pt called to self D/C reported feeling better per call log. Will D/C at this time.     [x]Discontinue therapy: []Patient has reached or is progressing toward set goals      [x]Patient is non-compliant or has abdicated      []Due to lack of appreciable progress towards set goals    Marylen Crofts, DPT, CMTPT 2018 11:45 AM

## 2018-05-17 ENCOUNTER — APPOINTMENT (OUTPATIENT)
Dept: PHYSICAL THERAPY | Age: 43
End: 2018-05-17

## 2018-05-22 ENCOUNTER — OFFICE VISIT (OUTPATIENT)
Dept: ORTHOPEDIC SURGERY | Age: 43
End: 2018-05-22

## 2018-05-22 ENCOUNTER — APPOINTMENT (OUTPATIENT)
Dept: PHYSICAL THERAPY | Age: 43
End: 2018-05-22

## 2018-05-22 VITALS
RESPIRATION RATE: 16 BRPM | DIASTOLIC BLOOD PRESSURE: 64 MMHG | OXYGEN SATURATION: 99 % | HEIGHT: 64 IN | TEMPERATURE: 97.5 F | WEIGHT: 163.2 LBS | BODY MASS INDEX: 27.86 KG/M2 | HEART RATE: 75 BPM | SYSTOLIC BLOOD PRESSURE: 118 MMHG

## 2018-05-22 DIAGNOSIS — M72.2 PLANTAR FASCIITIS, RIGHT: Primary | ICD-10-CM

## 2018-05-22 RX ORDER — TRIAMCINOLONE ACETONIDE 40 MG/ML
40 INJECTION, SUSPENSION INTRA-ARTICULAR; INTRAMUSCULAR ONCE
Qty: 1 ML | Refills: 0
Start: 2018-05-22 | End: 2018-05-22

## 2018-05-22 NOTE — MR AVS SNAPSHOT
[address] [place] [address], [address] [facility] 
[phone] Patient: Maria Elena Wade MRN: [id number] :1975 Visit Information Date & Time Provider Department Dept. Phone Encounter #  
 2018  3:10 PM Manjinder Zuñiga, 27 [facility] [phone] Upcoming Health Maintenance Date Due DTaP/Tdap/Td series (1 - Tdap) 10/26/1995 PAP AKA CERVICAL CYTOLOGY 10/26/1995 Influenza Age 5 to Adult 2018 Allergies as of 2018  Review Complete On: 2018 By: Romulo Mckeon No Known Allergies Current Immunizations  Never Reviewed No immunizations on file. Not reviewed this visit You Were Diagnosed With   
  
 Codes Comments Plantar fasciitis, right    -  Primary ICD-10-CM: M72.2 ICD-9-CM: 728.71 Vitals BP Pulse Temp Resp Height(growth percentile) Weight(growth percentile)  
 118/64 75 97.5 °F (36.4 °C) (Oral) 16 5' 4\" (1.626 m) 163 lb 3.2 oz (74 kg) SpO2 BMI OB Status Smoking Status 99% 28.01 kg/m2 Ablation Never Smoker BMI and BSA Data Body Mass Index Body Surface Area 28.01 kg/m 2 1.83 m 2 [facility] [address] [facility], [address] [place] [address] [phone] Your Updated Medication List  
  
   
This list is accurate as of 18  4:15 PM.  Always use your most recent med list.  
  
  
  
  
 cyclobenzaprine 10 mg tablet Commonly known as:  FLEXERIL Take 0.5 Tabs by mouth three (3) times daily as needed for Muscle Spasm(s). FISH OIL 1,000 mg Cap Generic drug:  omega-3 fatty acids-vitamin e Take  by mouth.  
  
 meloxicam 15 mg tablet Commonly known as:  MOBIC Take one tablet by mouth daily with food. START THIS MEDICATION AFTER COMPLETING THE MEDROL DOSE PACK  
  
 methylPREDNISolone 4 mg tablet Commonly known as:  Khanh Pierce Per dose pack instructions MULTIVITAMIN PO Take  by mouth. omeprazole 40 mg capsule Commonly known as:  PRILOSEC Take 1 Cap by mouth daily. triamcinolone acetonide 40 mg/mL injection Commonly known as:  KENALOG  
1 mL by IntraMUSCular route once for 1 dose. VITAMIN D3 1,000 unit tablet Generic drug:  cholecalciferol Take  by mouth daily. We Performed the Following INJECT TENDON SHEATH/LIGAMENT [id number] CPT(R)] TRIAMCINOLONE ACETONIDE INJ [ [facility]] Patient Instructions Please follow up in 5 weeks. You are advised to contact us if your condition worsens. Plantar Fasciitis: Exercises Your Care Instructions Here are some examples of typical rehabilitation exercises for your condition. Start each exercise slowly. Ease off the exercise if you start to have pain. Your doctor or physical therapist will tell you when you can start these exercises and which ones will work best for you. How to do the exercises Towel stretch 1. Sit with your legs extended and knees straight. 2. Place a towel around your foot just under the toes. 3. Hold each end of the towel in each hand, with your hands above your knees. 4. Pull back with the towel so that your foot stretches toward you. 5. Hold the position for at least 15 to 30 seconds. 6. Repeat 2 to 4 times a session, up to 5 sessions a day. Calf stretch This exercise stretches the muscles at the back of the lower leg (the calf) and the Achilles tendon. Do this exercise 3 or 4 times a day, 5 days a week. 1. Stand facing a wall with your hands on the wall at about eye level. Put the leg you want to stretch about a step behind your other leg. 2. Keeping your back heel on the floor, bend your front knee until you feel a stretch in the back leg. 3. Hold the stretch for 15 to 30 seconds. Repeat 2 to 4 times. Plantar fascia and calf stretch Stretching the plantar fascia and calf muscles can increase flexibility and decrease heel pain. You can do this exercise several times each day and before and after activity. 1. Stand on a step as shown above. Be sure to hold on to the banister. 2. Slowly let your heels down over the edge of the step as you relax your calf muscles. You should feel a gentle stretch across the bottom of your foot and up the back of your leg to your knee. 3. Hold the stretch about 15 to 30 seconds, and then tighten your calf muscle a little to bring your heel back up to the level of the step. Repeat 2 to 4 times. Towel curls Make this exercise more challenging by placing a weighted object, such as a soup can, on the other end of the towel. 1. While sitting, place your foot on a towel on the floor and scrunch the towel toward you with your toes. 2. Then, also using your toes, push the towel away from you. [place] pickups 1. Put marbles on the floor next to a cup. 
2. Using your toes, try to lift the marbles up from the floor and put them in the cup. Follow-up care is a key part of your treatment and safety. Be sure to make and go to all appointments, and call your doctor if you are having problems. It's also a good idea to know your test results and keep a list of the medicines you take. Where can you learn more? Go to http://siobhan-eliseo.info/. Deloris Rashid in the search box to learn more about \"Plantar Fasciitis: Exercises. \" Current as of: March 21, 2017 Content Version: 11.4 © 9576-6838 [employer]. Care instructions adapted under license by [employer] (which disclaims liability or warranty for this information). If you have questions about a medical condition or this instruction, always ask your healthcare professional. [address] any warranty or liability for your use of this information. Introducing [facility]! [facility] introduces Wintegra patient portal. Now you can access parts of your medical record, email your doctor's office, and request medication refills online. 1. In your internet browser, go to [email or web address] 2. Click on the First Time User? Click Here link in the Sign In box. You will see the New Member Sign Up page. 3. Enter your Wintegra Access Code exactly as it appears below. You will not need to use this code after youve completed the sign-up process. If you do not sign up before the expiration date, you must request a new code. · Wintegra Access Code: [email or web address] Expires: 7/12/2018 11:17 AM 
 
4. Enter the last four digits of your Social Security Number (xxxx) and Date of Birth (mm/dd/yyyy) as indicated and click Submit. You will be taken to the next sign-up page. 5. Create a Wintegra ID. This will be your Wintegra login ID and cannot be changed, so think of one that is secure and easy to remember. 6. Create a Wintegra password. You can change your password at any time. 7. Enter your Password Reset Question and Answer. This can be used at a later time if you forget your password. 8. Enter your e-mail address. You will receive e-mail notification when new information is available in [address] 9. Click Sign Up. You can now view and download portions of your medical record. 10. Click the Download Summary menu link to download a portable copy of your medical information. If you have questions, please visit the Frequently Asked Questions section of the Wintegra website. Remember, Wintegra is NOT to be used for urgent needs. For medical emergencies, dial 911. Now available from your iPhone and Android! Please provide this summary of care documentation to your next provider. Your primary care clinician is listed as Carlos Linn. If you have any questions after today's visit, please call [phone].

## 2018-05-22 NOTE — PROGRESS NOTES
AMBULATORY PROGRESS NOTE      Patient: Michelle Aguirre             MRN: [id number]     SSN: [id number] Body mass index is 28.01 kg/(m^2). YOB: 1975     AGE: 36 y.o. EX: female    PCP: Jonel Theodore MD    IMPRESSION/DIAGNOSIS AND TREATMENT PLAN     DIAGNOSES  1. Plantar fasciitis, right        Orders Placed This Encounter    INJECT TENDON SHEATH/LIGAMENT    TRIAMCINOLONE ACETONIDE INJ    triamcinolone acetonide (KENALOG) 40 mg/mL injection      Michelle Aguirre understands her diagnoses and the proposed plan. Plan:    1) Cortisone injection to the Right Plantar fascia: 1 mL of Kenalog and 3 mL of Lidocaine. 2) Continue activity modification as directed. 3)  RICE today: ICE today and tomorrow    RTO - 5 weeks    HPI AND EXAMINATION     Michelle Aguirre IS A 36 y.o. female who presents to my outpatient office for follow up of arch pain of the right foot, and right plantar fasciitis. At last visit, Quentin Anderson PA-C, prescribed a Medrol Dose pack, Mobic 15 mg, provided a dorsal night splint, and gave a HEP. Since we saw her last, Ms. Clarissa Perry reports she continues to experience pain along her right inferomedial plantar fascia. She notes the pain has been present consistently and it has caused her to limp while walking. Patient notes the discomfort is worse while her toes are extended. She notes the discomfort went away after using the Medrol dose pack, however was brought on again after Mother's day (5/13). 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 Appearance: Alert, well appearing and pleasant patient who is in no distress, oriented to person, place/time, and who follows commands. Psychiatric: Affect and mood are appropriate. Cardiovascular/Peripheral Vascular: Normal Pulses to each hand and foot  Musculoskeletal:  LOCATION:  Right FOOT/ANKLE  Integumentary: No rashes, skin patches, wounds, or abrasions to the right or left legs       Warm and normal color.  No regions of expressible drainage. Palpable fullness or mass is not present      Gait: Limp with gait is present mild      Tenderness: moderate PLANTAR FASCIA REGION, with no NODULARITIES      Motor/Strength/Tone Exam: Normal DF, INV,EVERSION. Slightly diminished PF           strength due to plantar fascial tenderness      Sensory Exam:   Intact Normal Sensation to ankle/foot      Stability Testing: No anterolateral or varus instability of the Ankle or Subtalar Joints               No peroneal tendon instability noted      ROM: Normal ROM noted to ankle/foot      Contractures: No Achilles or Gastrocnemius Contractures      Calf tenderness: Absent for calf or gastrocnemius muscle regions       Soft, supple, non tender, non taut lower extremity compartments       Alignment: Neutral Hindfoot  Wounds/Abrasions:   None present  Extremities:   No embolic phenomena to the toes or hands         No significant edema to the foot and or toes. Lower extremities are warm and appear well perfused    DVT: No evidence of DVT seen on examination at this time     No calf swelling, no tenderness to calf muscles  Lymphatic:  No Evidence of Lymphedema  Vascular: Medial Border of Tibia Region: Edema is not present        Pulses: Dorsalis Pedis &  Posterior Tibial Pulses : Palpable yes        Varicosities Lower Limbs :  None    Neuro: Negative bilateral Straight leg raise (seated position)   no Tinel's at PM NV Bundle Tarsal Canal   no Proximal Tarsal Tunnel Tenderness    no Distal Tarsal Tunnel Tenderness    See Musculoskeletal section for pertinent individual extremity examination    No abnormal hand/wrist, foot/ankle, or facial/neck tremors.     CHART REVIEW     Past Medical History:   Diagnosis Date    Acute iritis 10/2010    right eye    Anemia NEC 1995    with pregnancy only    Bell's palsy 1/14/2011    Pain in the neck 10/27/2010     Current Outpatient Prescriptions   Medication Sig    triamcinolone acetonide (KENALOG) 40 mg/mL injection 1 mL by IntraMUSCular route once for 1 dose.  meloxicam (MOBIC) 15 mg tablet Take one tablet by mouth daily with food. START THIS MEDICATION AFTER COMPLETING THE MEDROL DOSE PACK    omeprazole (PRILOSEC) 40 mg capsule Take 1 Cap by mouth daily.  cyclobenzaprine (FLEXERIL) 10 mg tablet Take 0.5 Tabs by mouth three (3) times daily as needed for Muscle Spasm(s).  MULTIVITAMIN PO Take  by mouth.  cholecalciferol, vitamin d3, (VITAMIN D) 1,000 unit tablet Take  by mouth daily.  omega-3 fatty acids-vitamin e (FISH OIL) 1,000 mg Cap Take  by mouth.  methylPREDNISolone (MEDROL DOSEPACK) 4 mg tablet Per dose pack instructions     No current facility-administered medications for this visit. No Known Allergies  Past Surgical History:   Procedure Laterality Date    HX HEENT      gum grafting     Social History     Occupational History    Not on file. Social History Main Topics    Smoking status: Never Smoker    Smokeless tobacco: Never Used    Alcohol use Yes      Comment: rare    Drug use: No    Sexual activity: Yes     Partners: Male     Birth control/ protection: Condom     Family History   Problem Relation Age of Onset    Diabetes Father     Hypertension Maternal Grandmother     Elevated Lipids Maternal Grandmother     Heart Disease Maternal Grandmother     Stroke Maternal Grandmother        REVIEW OF SYSTEMS : 5/22/2018  ALL BELOW ARE Negative except : SEE HPI       Constitutional: Negative for fever, chills and weight loss. Neg Weigh Loss  Cardiovascular: Negative for chest pain, claudication and leg swelling. SOB, BROWN   Gastrointestinal: Negative for  pain, N/V/D/C, Blood in stool or urine,dysuria, hematuria,        Incontinence, pelvic pain  Musculoskeletal: see HPI. Neurological: Negative for dizziness and weakness. Negative for headaches,Visual Changes, Confusion, Seizures,   Psychiatric/Behavioral: Negative for depression, memory loss and substance abuse. Extremities:  Negative for  hair changes, rash or skin lesion changes. Hematologic: Negative for Bleeding problems, bruising, pallor or swollen lymph nodes. Peripheral Vascular: No calf pain, vascular vein tenderness to calf pain              No calf throbbing, posterior knee throbbing pain    DIAGNOSTIC IMAGING        PROCEDURE       YUVAL PROCEDURE OUTPATIENT PROCEDURE    PROCEDURE:  Injection of the Right PLANTAR FASCIA     Chart reviewed for the following:     IrTang MD, have reviewed the History, Physical and updated the Allergic reactions for Melba Vaughan 647 performed immediately prior to start of procedure:       * Patient was identified by name Marii Goldstein and date of birth   * Agreement on procedure being performed was verified  * Risks and Benefits explained to the patient: see below  * Procedure site verified and marked as necessary  * Patient was positioned for comfort  * Verbal Consent and Written Consent Verified by myself and my office staff. * Complications: None  *  All patient and/or family questions answered     The procedure was explained to the patient and possible adverse reactions were discussed. These risks included, but not limited to: bleeding, infection, septic arthritis, local skin irritation (skin discoloration, hyperpigmentation, hypopigmentation, thinning of the skin, development of a wound, skin necrosis), synovitis, subcutaneous fat pad atrophy, subcutaneous abscess, tendon rupture. Time: 4:00 PM  Date of procedure: 5/22/2018  Procedure performed by: Trang Gardner MD  Provider assisted by:  MA  Patient assisted by: self  How tolerated by patient: tolerated the procedure well with no complications  Comments: none    After verbal and informed consent, and after all patient and family questions answered, the procedure was performed as below:      The Right MEDIAL HINDFOOT area was prepped and cleansed with: sterile fashion using a follow solution: ALCOHOL/BETADIENE STERILE PREP. The injection was administered:  A solution of 40 mg of  KENALOG and 3 ml of LIDOCAINE % plain was used. The pain assessment score PRIOR/AFTER to the injection: 5 /10 // 5 10 pain severity, moderate intensity, aching and dull Pain quality    Post injection instructions were given: removed band aid 3 hours, Wash site with clean soap, No further dressings there after. Call me if any: pain, redness, drainage, fever. Nabil Zamora tolerated the procedure well and was advised on the signs of infection and instructed to go to the ER or call the office if Nabil Zamora becomes concerned about the area being infected. Written by Carlos Villa, as dictated by Lila Morse MD. Dr. TATI, Lila Morse MD, confirm that all documentation is accurate.

## 2018-05-22 NOTE — PROCEDURES
PROCEDURE       YUVAL PROCEDURE OUTPATIENT PROCEDURE    PROCEDURE:  Injection of the Right PLANTAR FASCIA     Chart reviewed for the following:     I, Hamida Thompson MD, have reviewed the History, Physical and updated the Allergic reactions for Melba Vaughan 647 performed immediately prior to start of procedure:       * Patient was identified by name Bro Dobson and date of birth   * Agreement on procedure being performed was verified  * Risks and Benefits explained to the patient: see below  * Procedure site verified and marked as necessary  * Patient was positioned for comfort  * Verbal Consent and Written Consent Verified by myself and my office staff. * Complications: None  *  All patient and/or family questions answered     The procedure was explained to the patient and possible adverse reactions were discussed. These risks included, but not limited to: bleeding, infection, septic arthritis, local skin irritation (skin discoloration, hyperpigmentation, hypopigmentation, thinning of the skin, development of a wound, skin necrosis), synovitis, subcutaneous fat pad atrophy, subcutaneous abscess, tendon rupture. Time: 4:00 PM  Date of procedure: 5/22/2018  Procedure performed by: Hamida Thompson MD  Provider assisted by:  MA  Patient assisted by: self  How tolerated by patient: tolerated the procedure well with no complications  Comments: none    After verbal and informed consent, and after all patient and family questions answered, the procedure was performed as below: The Right MEDIAL HINDFOOT area was prepped and cleansed with: sterile fashion using a follow solution: ALCOHOL/BETADIENE STERILE PREP. The injection was administered:  A solution of 40 mg of  KENALOG and 3 ml of LIDOCAINE % plain was used.      The pain assessment score PRIOR/AFTER to the injection: 5 /10 // 5 10 pain severity, moderate intensity, aching and dull Pain quality    Post injection instructions were given: removed band aid 3 hours, Wash site with clean soap, No further dressings there after. Call me if any: pain, redness, drainage, fever. Hayde Crowell tolerated the procedure well and was advised on the signs of infection and instructed to go to the ER or call the office if Hayde Crowell becomes concerned about the area being infected.

## 2018-05-22 NOTE — PATIENT INSTRUCTIONS
Please follow up in 5 weeks. You are advised to contact us if your condition worsens. Plantar Fasciitis: Exercises  Your Care Instructions  Here are some examples of typical rehabilitation exercises for your condition. Start each exercise slowly. Ease off the exercise if you start to have pain. Your doctor or physical therapist will tell you when you can start these exercises and which ones will work best for you. How to do the exercises  Towel stretch    1. Sit with your legs extended and knees straight. 2. Place a towel around your foot just under the toes. 3. Hold each end of the towel in each hand, with your hands above your knees. 4. Pull back with the towel so that your foot stretches toward you. 5. Hold the position for at least 15 to 30 seconds. 6. Repeat 2 to 4 times a session, up to 5 sessions a day. Calf stretch    This exercise stretches the muscles at the back of the lower leg (the calf) and the Achilles tendon. Do this exercise 3 or 4 times a day, 5 days a week. 1. Stand facing a wall with your hands on the wall at about eye level. Put the leg you want to stretch about a step behind your other leg. 2. Keeping your back heel on the floor, bend your front knee until you feel a stretch in the back leg. 3. Hold the stretch for 15 to 30 seconds. Repeat 2 to 4 times. Plantar fascia and calf stretch    Stretching the plantar fascia and calf muscles can increase flexibility and decrease heel pain. You can do this exercise several times each day and before and after activity. 1. Stand on a step as shown above. Be sure to hold on to the banister. 2. Slowly let your heels down over the edge of the step as you relax your calf muscles. You should feel a gentle stretch across the bottom of your foot and up the back of your leg to your knee. 3. Hold the stretch about 15 to 30 seconds, and then tighten your calf muscle a little to bring your heel back up to the level of the step.  Repeat 2 to 4 times. Towel curls    Make this exercise more challenging by placing a weighted object, such as a soup can, on the other end of the towel. 1. While sitting, place your foot on a towel on the floor and scrunch the towel toward you with your toes. 2. Then, also using your toes, push the towel away from you. [place] pickups    1. Put marbles on the floor next to a cup.  2. Using your toes, try to lift the marbles up from the floor and put them in the cup. Follow-up care is a key part of your treatment and safety. Be sure to make and go to all appointments, and call your doctor if you are having problems. It's also a good idea to know your test results and keep a list of the medicines you take. Where can you learn more? Go to http://siobhan-eliseo.info/. Deloris Rashid in the search box to learn more about \"Plantar Fasciitis: Exercises. \"  Current as of: March 21, 2017  Content Version: 11.4  © 5047-0305 [employer]. Care instructions adapted under license by [employer] (which disclaims liability or warranty for this information). If you have questions about a medical condition or this instruction, always ask your healthcare professional. [address] any warranty or liability for your use of this information.

## 2018-05-24 ENCOUNTER — APPOINTMENT (OUTPATIENT)
Dept: PHYSICAL THERAPY | Age: 43
End: 2018-05-24

## 2018-05-29 ENCOUNTER — APPOINTMENT (OUTPATIENT)
Dept: PHYSICAL THERAPY | Age: 43
End: 2018-05-29

## 2018-06-05 ENCOUNTER — OFFICE VISIT (OUTPATIENT)
Dept: ORTHOPEDIC SURGERY | Age: 43
End: 2018-06-05

## 2018-06-05 VITALS
OXYGEN SATURATION: 94 % | RESPIRATION RATE: 16 BRPM | WEIGHT: 162 LBS | BODY MASS INDEX: 27.66 KG/M2 | TEMPERATURE: 97.7 F | HEIGHT: 64 IN | DIASTOLIC BLOOD PRESSURE: 68 MMHG | SYSTOLIC BLOOD PRESSURE: 117 MMHG | HEART RATE: 86 BPM

## 2018-06-05 DIAGNOSIS — M72.2 PLANTAR FASCIITIS, RIGHT: Primary | ICD-10-CM

## 2018-06-05 NOTE — PROGRESS NOTES
Patient: Skyler Renae                MRN: [id number]       SSN: [id number]  YOB: 1975              AGE: 36 y.o. SEX: female    Tano Marcano MD      Chief Complaint:   Chief Complaint   Patient presents with    Foot Injury     right foot injury follow up           HPI:     Skyler Renae is a 36 y.o. female who presents today for evaluation of right plantar fasciitis. Patient was last seen in the office on 5/22/2018 by Dr. Seng Washington. The patient's prior history is detailed in the previous encounter. At the last visit, the patient's plan was as follows:    Plan:    1) Cortisone injection to the Right Plantar fascia: 1 mL of Kenalog and 3 mL of Lidocaine. 2) Continue activity modification as directed. 3)  RICE today: ICE today and tomorrow  4) RTO - 5 weeks      Since we saw her last, Ms. Ajit Rubin reports she continues to experience pain along her right inferomedial plantar fascia. She notes the pain has been present consistently and it has caused her to limp while walking. Patient notes the discomfort is worse while her toes are extended. She notes that she did not notice any improvement from the injection. She was previously provided an order for PT as well has Mobic. She has not scheduled PT and she never used the Mobic. 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 PHYSICAL EXAM:     Visit Vitals    /68    Pulse 86    Temp 97.7 °F (36.5 °C) (Oral)    Resp 16    Ht 5' 4\" (1.626 m)    Wt 162 lb (73.5 kg)    SpO2 94%    BMI 27.81 kg/m2     Pain Scale: 5/10    Appearance: Alert, well appearing and pleasant patient who is in no distress, oriented to person, place/time, and who follows commands. Psychiatric: Affect and mood are appropriate.    Cardiovascular/Peripheral Vascular: Normal Pulses to each hand and foot  Musculoskeletal:  LOCATION:  Right FOOT/ANKLE  Integumentary: No rashes, skin patches, wounds, or abrasions to the right or left legs       Warm and normal color. No regions of expressible drainage. Palpable fullness or mass is not present      Gait: Limp with gait is present mild      Tenderness: moderate PLANTAR FASCIA REGION, with no NODULARITIES      Motor/Strength/Tone Exam: Normal DF, INV,EVERSION. Slightly diminished PF           strength due to plantar fascial tenderness      Sensory Exam:   Intact Normal Sensation to ankle/foot      Stability Testing: No anterolateral or varus instability of the Ankle or Subtalar Joints               No peroneal tendon instability noted      ROM: Normal ROM noted to ankle/foot      Contractures: No Achilles or Gastrocnemius Contractures      Calf tenderness: Absent for calf or gastrocnemius muscle regions       Soft, supple, non tender, non taut lower extremity compartments       Alignment: Neutral Hindfoot  Wounds/Abrasions:   None present  Extremities:   No embolic phenomena to the toes or hands         No significant edema to the foot and or toes. Lower extremities are warm and appear well perfused    DVT: No evidence of DVT seen on examination at this time     No calf swelling, no tenderness to calf muscles  Lymphatic:  No Evidence of Lymphedema  Vascular: Medial Border of Tibia Region: Edema is not present        Pulses: Dorsalis Pedis &  Posterior Tibial Pulses : Palpable yes        Varicosities Lower Limbs :  None    Neuro: Negative bilateral Straight leg raise (seated position)   no Tinel's at PM NV Bundle Tarsal Canal   no Proximal Tarsal Tunnel Tenderness    no Distal Tarsal Tunnel Tenderness    See Musculoskeletal section for pertinent individual extremity examination    No abnormal hand/wrist, foot/ankle, or facial/neck tremors. IMPRESSION:     1. Plantar fasciitis, right          PLAN:             Please start taking Mobic 15 mg tabs, one tab per day.  Please take with Prilosec  Order provided for PT  Continue stretching exercises  Roll right foot with frozen water bottle  Follow up in 3 weeks or sooner as needed              Patient has been discussed with Dr. Dustin Foreman during this visit and he agrees with the assessment and plan. Patient understands treatment plan and has been provided with patient education. PAST MEDICAL HISTORY:     Past Medical History:   Diagnosis Date    Acute iritis 10/2010    right eye    Anemia NEC 1995    with pregnancy only    Bell's palsy 1/14/2011    Pain in the neck 10/27/2010       MEDICATIONS:     Current Outpatient Prescriptions   Medication Sig    methylPREDNISolone (MEDROL DOSEPACK) 4 mg tablet Per dose pack instructions    meloxicam (MOBIC) 15 mg tablet Take one tablet by mouth daily with food. START THIS MEDICATION AFTER COMPLETING THE MEDROL DOSE PACK    omeprazole (PRILOSEC) 40 mg capsule Take 1 Cap by mouth daily.  cyclobenzaprine (FLEXERIL) 10 mg tablet Take 0.5 Tabs by mouth three (3) times daily as needed for Muscle Spasm(s).  MULTIVITAMIN PO Take  by mouth.  cholecalciferol, vitamin d3, (VITAMIN D) 1,000 unit tablet Take  by mouth daily.  omega-3 fatty acids-vitamin e (FISH OIL) 1,000 mg Cap Take  by mouth. No current facility-administered medications for this visit. ALLERGIES:     No Known Allergies      PAST SURGICAL HISTORY:     Past Surgical History:   Procedure Laterality Date    HX HEENT      gum grafting       SOCIAL HISTORY:     Social History     Social History    Marital status:      Spouse name: N/A    Number of children: N/A    Years of education: N/A     Occupational History    Not on file.      Social History Main Topics    Smoking status: Never Smoker    Smokeless tobacco: Never Used    Alcohol use Yes      Comment: rare    Drug use: No    Sexual activity: Yes     Partners: Male     Birth control/ protection: Condom     Other Topics Concern    Not on file     Social History Narrative       FAMILY HISTORY:     Family History   Problem Relation Age of Onset    Diabetes Father    [facility] Hypertension Maternal Grandmother     Elevated Lipids Maternal Grandmother     Heart Disease Maternal Grandmother     Stroke Maternal Grandmother          REVIEW OF SYSTEMS:     Otherwise as noted in HPI      RADIOGRAPHS & DIAGNOSTIC STUDIES     No results found for any visits on 06/05/18.         Ronak Borrego PA-C  6/5/2018

## 2018-06-05 NOTE — MR AVS SNAPSHOT
[address] [place] [address], [address] [facility] 
[phone] Patient: Paul Blackwell MRN: [id number] :1975 Visit Information Date & Time Provider Department Dept. Phone Encounter #  
 2018  2:45 PM Brandy Quinones, 27 [facility] [id number] [phone] Follow-up Instructions Return in about 3 weeks (around 2018) for follow up evaluation. Upcoming Health Maintenance Date Due DTaP/Tdap/Td series (1 - Tdap) 10/26/1995 PAP AKA CERVICAL CYTOLOGY 10/26/1995 Influenza Age 5 to Adult 2018 Allergies as of 2018  Review Complete On: 2018 By: Domonique Rosales PA-C No Known Allergies Current Immunizations  Never Reviewed No immunizations on file. Not reviewed this visit You Were Diagnosed With   
  
 Codes Comments Plantar fasciitis, right    -  Primary ICD-10-CM: M72.2 ICD-9-CM: 728.71 Vitals BP Pulse Temp Resp Height(growth percentile) Weight(growth percentile)  
 117/68 86 97.7 °F (36.5 °C) (Oral) 16 5' 4\" (1.626 m) 162 lb (73.5 kg) SpO2 BMI OB Status Smoking Status 94% 27.81 kg/m2 Ablation Never Smoker BMI and BSA Data Body Mass Index Body Surface Area  
 27.81 kg/m 2 1.82 m 2 Preferred Pharmacy Pharmacy Name Phone [facility] [address] [phone] Your Updated Medication List  
  
   
This list is accurate as of 18  4:16 PM.  Always use your most recent med list.  
  
  
  
  
 cyclobenzaprine 10 mg tablet Commonly known as:  FLEXERIL Take 0.5 Tabs by mouth three (3) times daily as needed for Muscle Spasm(s). FISH OIL 1,000 mg Cap Generic drug:  omega-3 fatty acids-vitamin e Take  by mouth.  
  
 meloxicam 15 mg tablet Commonly known as:  MOBIC Take one tablet by mouth daily with food.  START THIS MEDICATION AFTER COMPLETING THE MEDROL DOSE PACK  
  
 methylPREDNISolone 4 mg tablet Commonly known as:  Vinny Sinks Per dose pack instructions MULTIVITAMIN PO Take  by mouth. omeprazole 40 mg capsule Commonly known as:  PRILOSEC Take 1 Cap by mouth daily. VITAMIN D3 1,000 unit tablet Generic drug:  cholecalciferol Take  by mouth daily. Follow-up Instructions Return in about 3 weeks (around 6/26/2018) for follow up evaluation. Patient Instructions Please start taking Mobic 15 mg tabs, one tab per day. Please take with Prilosec Order provided for PT Continue stretching exercises Roll right foot with frozen water bottle Follow up in 3 weeks or sooner as needed Plantar Fasciitis: Care Instructions Your Care Instructions Plantar fasciitis is pain and inflammation of the plantar fascia, the tissue at the bottom of your foot that connects the heel bone to the toes. The plantar fascia also supports the arch. If you strain the plantar fascia, it can develop small tears and cause heel pain when you stand or walk. Plantar fasciitis can be caused by running or other sports. It also may occur in people who are overweight or who have high arches or flat feet. You may get plantar fasciitis if you walk or stand for long periods, or have a tight Achilles tendon or calf muscles. You can improve your foot pain with rest and other care at home. It might take a few weeks to a few months for your foot to heal completely. Follow-up care is a key part of your treatment and safety. Be sure to make and go to all appointments, and call your doctor if you are having problems. It's also a good idea to know your test results and keep a list of the medicines you take. How can you care for yourself at home? · Rest your feet often. Reduce your activity to a level that lets you avoid pain. If possible, do not run or walk on hard surfaces. · Take pain medicines exactly as directed. ¨ If the doctor gave you a prescription medicine for pain, take it as prescribed. ¨ If you are not taking a prescription pain medicine, take an over-the-counter anti-inflammatory medicine for pain and swelling, such as ibuprofen (Advil, Motrin) or naproxen (Aleve). Read and follow all instructions on the label. · Use ice massage to help with pain and swelling. You can use an ice cube or an ice cup several times a day. To make an ice cup, fill a paper cup with water and freeze it. Cut off the top of the cup until a half-inch of ice shows. Hold onto the remaining paper to use the cup. Rub the ice in small circles over the area for 5 to 7 minutes. · Contrast baths, which alternate hot and cold water, can also help reduce swelling. But because heat alone may make pain and swelling worse, end a contrast bath with a soak in cold water. · Wear a night splint if your doctor suggests it. A night splint holds your foot with the toes pointed up and the foot and ankle at a 90-degree angle. This position gives the bottom of your foot a constant, gentle stretch. · Do simple exercises such as calf stretches and towel stretches 2 to 3 times each day, especially when you first get up in the morning. These can help the plantar fascia become more flexible. They also make the muscles that support your arch stronger. Hold these stretches for 15 to 30 seconds per stretch. Repeat 2 to 4 times. ¨ Stand about 1 foot from a wall. Place the palms of both hands against the wall at chest level. Lean forward against the wall, keeping one leg with the knee straight and heel on the ground while bending the knee of the other leg. ¨ Sit down on the floor or a mat with your feet stretched in front of you. Roll up a towel lengthwise, and loop it over the ball of your foot. Holding the towel at both ends, gently pull the towel toward you to stretch your foot. · Wear shoes with good arch support. Athletic shoes or shoes with a well-cushioned sole are good choices. · Try heel cups or shoe inserts (orthotics) to help cushion your heel. You can buy these at many shoe stores. · Put on your shoes as soon as you get out of bed. Going barefoot or wearing slippers may make your pain worse. · Reach and stay at a good weight for your height. This puts less strain on your feet. When should you call for help? Call your doctor now or seek immediate medical care if: 
· You have heel pain with fever, redness, or warmth in your heel. · You cannot put weight on the sore foot. Watch closely for changes in your health, and be sure to contact your doctor if: 
· You have numbness or tingling in your heel. · Your heel pain lasts more than 2 weeks. Where can you learn more? Go to http://siobhanLot78eliseo.info/. Delfino Dominguez in the search box to learn more about \"Plantar Fasciitis: Care Instructions. \" Current as of: March 21, 2017 Content Version: 11.4 © 4677-8578 [employer]. Care instructions adapted under license by [employer] (which disclaims liability or warranty for this information). If you have questions about a medical condition or this instruction, always ask your healthcare professional. [address] any warranty or liability for your use of this information. Plantar Fasciitis: Exercises Your Care Instructions Here are some examples of typical rehabilitation exercises for your condition. Start each exercise slowly. Ease off the exercise if you start to have pain. Your doctor or physical therapist will tell you when you can start these exercises and which ones will work best for you. How to do the exercises Towel stretch 1. Sit with your legs extended and knees straight. 2. Place a towel around your foot just under the toes.  
3. Hold each end of the towel in each hand, with your hands above your knees. 
4. Pull back with the towel so that your foot stretches toward you. 5. Hold the position for at least 15 to 30 seconds. 6. Repeat 2 to 4 times a session, up to 5 sessions a day. Calf stretch This exercise stretches the muscles at the back of the lower leg (the calf) and the Achilles tendon. Do this exercise 3 or 4 times a day, 5 days a week. 1. Stand facing a wall with your hands on the wall at about eye level. Put the leg you want to stretch about a step behind your other leg. 2. Keeping your back heel on the floor, bend your front knee until you feel a stretch in the back leg. 3. Hold the stretch for 15 to 30 seconds. Repeat 2 to 4 times. Plantar fascia and calf stretch Stretching the plantar fascia and calf muscles can increase flexibility and decrease heel pain. You can do this exercise several times each day and before and after activity. 1. Stand on a step as shown above. Be sure to hold on to the banister. 2. Slowly let your heels down over the edge of the step as you relax your calf muscles. You should feel a gentle stretch across the bottom of your foot and up the back of your leg to your knee. 3. Hold the stretch about 15 to 30 seconds, and then tighten your calf muscle a little to bring your heel back up to the level of the step. Repeat 2 to 4 times. Towel curls Make this exercise more challenging by placing a weighted object, such as a soup can, on the other end of the towel. 1. While sitting, place your foot on a towel on the floor and scrunch the towel toward you with your toes. 2. Then, also using your toes, push the towel away from you. [place] pickups 1. Put marbles on the floor next to a cup. 
2. Using your toes, try to lift the marbles up from the floor and put them in the cup. Follow-up care is a key part of your treatment and safety.  Be sure to make and go to all appointments, and call your doctor if you are having problems. It's also a good idea to know your test results and keep a list of the medicines you take. Where can you learn more? Go to http://siobhan-eliseo.info/. Anika Bearden in the search box to learn more about \"Plantar Fasciitis: Exercises. \" Current as of: March 21, 2017 Content Version: 11.4 © 9301-5899 [employer]. Care instructions adapted under license by [employer] (which disclaims liability or warranty for this information). If you have questions about a medical condition or this instruction, always ask your healthcare professional. [address] any warranty or liability for your use of this information. Introducing [facility]! Abigail Morton introduces [email or web address] patient portal. Now you can access parts of your medical record, email your doctor's office, and request medication refills online. 1. In your internet browser, go to [email or web address] 2. Click on the First Time User? Click Here link in the Sign In box. You will see the New Member Sign Up page. 3. Enter your [email or web address] Access Code exactly as it appears below. You will not need to use this code after youve completed the sign-up process. If you do not sign up before the expiration date, you must request a new code. · [email or web address] Access Code: [email or web address] Expires: 7/12/2018 11:17 AM 
 
4. Enter the last four digits of your Social Security Number (xxxx) and Date of Birth (mm/dd/yyyy) as indicated and click Submit. You will be taken to the next sign-up page. 5. Create a [employer] ID. This will be your [email or web address] login ID and cannot be changed, so think of one that is secure and easy to remember. 6. Create a [email or web address] password. You can change your password at any time. 7. Enter your Password Reset Question and Answer. This can be used at a later time if you forget your password. 8. Enter your e-mail address. You will receive e-mail notification when new information is available in [address] 9. Click Sign Up. You can now view and download portions of your medical record. 10. Click the Download Summary menu link to download a portable copy of your medical information. If you have questions, please visit the Frequently Asked Questions section of the [employer] website. Remember, [employer] is NOT to be used for urgent needs. For medical emergencies, dial 911. Now available from your iPhone and Android! Please provide this summary of care documentation to your next provider. Your primary care clinician is listed as Pilar Méndez. If you have any questions after today's visit, please call [phone].

## 2018-06-05 NOTE — PATIENT INSTRUCTIONS
Please start taking Mobic 15 mg tabs, one tab per day. Please take with Prilosec  Order provided for PT  Continue stretching exercises  Roll right foot with frozen water bottle  Follow up in 3 weeks or sooner as needed         Plantar Fasciitis: Care Instructions  Your Care Instructions    Plantar fasciitis is pain and inflammation of the plantar fascia, the tissue at the bottom of your foot that connects the heel bone to the toes. The plantar fascia also supports the arch. If you strain the plantar fascia, it can develop small tears and cause heel pain when you stand or walk. Plantar fasciitis can be caused by running or other sports. It also may occur in people who are overweight or who have high arches or flat feet. You may get plantar fasciitis if you walk or stand for long periods, or have a tight Achilles tendon or calf muscles. You can improve your foot pain with rest and other care at home. It might take a few weeks to a few months for your foot to heal completely. Follow-up care is a key part of your treatment and safety. Be sure to make and go to all appointments, and call your doctor if you are having problems. It's also a good idea to know your test results and keep a list of the medicines you take. How can you care for yourself at home? · Rest your feet often. Reduce your activity to a level that lets you avoid pain. If possible, do not run or walk on hard surfaces. · Take pain medicines exactly as directed. ¨ If the doctor gave you a prescription medicine for pain, take it as prescribed. ¨ If you are not taking a prescription pain medicine, take an over-the-counter anti-inflammatory medicine for pain and swelling, such as ibuprofen (Advil, Motrin) or naproxen (Aleve). Read and follow all instructions on the label. · Use ice massage to help with pain and swelling. You can use an ice cube or an ice cup several times a day. To make an ice cup, fill a paper cup with water and freeze it. Cut off the top of the cup until a half-inch of ice shows. Hold onto the remaining paper to use the cup. Rub the ice in small circles over the area for 5 to 7 minutes. · Contrast baths, which alternate hot and cold water, can also help reduce swelling. But because heat alone may make pain and swelling worse, end a contrast bath with a soak in cold water. · Wear a night splint if your doctor suggests it. A night splint holds your foot with the toes pointed up and the foot and ankle at a 90-degree angle. This position gives the bottom of your foot a constant, gentle stretch. · Do simple exercises such as calf stretches and towel stretches 2 to 3 times each day, especially when you first get up in the morning. These can help the plantar fascia become more flexible. They also make the muscles that support your arch stronger. Hold these stretches for 15 to 30 seconds per stretch. Repeat 2 to 4 times. ¨ Stand about 1 foot from a wall. Place the palms of both hands against the wall at chest level. Lean forward against the wall, keeping one leg with the knee straight and heel on the ground while bending the knee of the other leg. ¨ Sit down on the floor or a mat with your feet stretched in front of you. Roll up a towel lengthwise, and loop it over the ball of your foot. Holding the towel at both ends, gently pull the towel toward you to stretch your foot. · Wear shoes with good arch support. Athletic shoes or shoes with a well-cushioned sole are good choices. · Try heel cups or shoe inserts (orthotics) to help cushion your heel. You can buy these at many shoe stores. · Put on your shoes as soon as you get out of bed. Going barefoot or wearing slippers may make your pain worse. · Reach and stay at a good weight for your height. This puts less strain on your feet. When should you call for help?   Call your doctor now or seek immediate medical care if:  · You have heel pain with fever, redness, or warmth in your heel.  · You cannot put weight on the sore foot. Watch closely for changes in your health, and be sure to contact your doctor if:  · You have numbness or tingling in your heel. · Your heel pain lasts more than 2 weeks. Where can you learn more? Go to http://siobhan-eliseo.info/. Zachery Krause in the search box to learn more about \"Plantar Fasciitis: Care Instructions. \"  Current as of: March 21, 2017  Content Version: 11.4  © 1661-0127 [employer]. Care instructions adapted under license by [employer] (which disclaims liability or warranty for this information). If you have questions about a medical condition or this instruction, always ask your healthcare professional. [address] any warranty or liability for your use of this information. Plantar Fasciitis: Exercises  Your Care Instructions  Here are some examples of typical rehabilitation exercises for your condition. Start each exercise slowly. Ease off the exercise if you start to have pain. Your doctor or physical therapist will tell you when you can start these exercises and which ones will work best for you. How to do the exercises  Towel stretch    1. Sit with your legs extended and knees straight. 2. Place a towel around your foot just under the toes. 3. Hold each end of the towel in each hand, with your hands above your knees. 4. Pull back with the towel so that your foot stretches toward you. 5. Hold the position for at least 15 to 30 seconds. 6. Repeat 2 to 4 times a session, up to 5 sessions a day. Calf stretch    This exercise stretches the muscles at the back of the lower leg (the calf) and the Achilles tendon. Do this exercise 3 or 4 times a day, 5 days a week. 1. Stand facing a wall with your hands on the wall at about eye level. Put the leg you want to stretch about a step behind your other leg.   2. Keeping your back heel on the floor, bend your front knee until you feel a stretch in the back leg. 3. Hold the stretch for 15 to 30 seconds. Repeat 2 to 4 times. Plantar fascia and calf stretch    Stretching the plantar fascia and calf muscles can increase flexibility and decrease heel pain. You can do this exercise several times each day and before and after activity. 1. Stand on a step as shown above. Be sure to hold on to the banister. 2. Slowly let your heels down over the edge of the step as you relax your calf muscles. You should feel a gentle stretch across the bottom of your foot and up the back of your leg to your knee. 3. Hold the stretch about 15 to 30 seconds, and then tighten your calf muscle a little to bring your heel back up to the level of the step. Repeat 2 to 4 times. Towel curls    Make this exercise more challenging by placing a weighted object, such as a soup can, on the other end of the towel. 1. While sitting, place your foot on a towel on the floor and scrunch the towel toward you with your toes. 2. Then, also using your toes, push the towel away from you. [place] pickups    1. Put marbles on the floor next to a cup.  2. Using your toes, try to lift the marbles up from the floor and put them in the cup. Follow-up care is a key part of your treatment and safety. Be sure to make and go to all appointments, and call your doctor if you are having problems. It's also a good idea to know your test results and keep a list of the medicines you take. Where can you learn more? Go to http://siobhan-eliseo.info/. Ozzie Kitchen in the search box to learn more about \"Plantar Fasciitis: Exercises. \"  Current as of: March 21, 2017  Content Version: 11.4  © 2262-4649 [employer]. Care instructions adapted under license by [employer] (which disclaims liability or warranty for this information).  If you have questions about a medical condition or this instruction, always ask your healthcare professional. Cath[employer] disclaims any warranty or liability for your use of this information.

## 2020-12-30 ENCOUNTER — HOSPITAL ENCOUNTER (OUTPATIENT)
Dept: PHYSICAL THERAPY | Age: 45
Discharge: HOME | End: 2020-12-30
Payer: COMMERCIAL

## 2020-12-30 PROCEDURE — 97140 MANUAL THERAPY 1/> REGIONS: CPT

## 2020-12-30 PROCEDURE — 97161 PT EVAL LOW COMPLEX 20 MIN: CPT

## 2020-12-30 PROCEDURE — 97110 THERAPEUTIC EXERCISES: CPT

## 2020-12-30 NOTE — PROGRESS NOTES
PT DAILY TREATMENT NOTE     Patient Name: Karen Shadow  Date:2020  : 1975  [x]  Patient  Verified  Payor: BLUE CROSS / Plan: [facility] [place] / Product Type: PPO /    In time:9:10  Out time:9:55  Total Treatment Time (min): 45  Visit #: 1 of 8    Medicare/BCBS Only   Total Timed Codes (min):  24 1:1 Treatment Time:  45       Treatment Area: Low back pain [M54.5]    SUBJECTIVE  Pain Level (0-10 scale): 2  Any medication changes, allergies to medications, adverse drug reactions, diagnosis change, or new procedure performed?: [x] No    [] Yes (see summary sheet for update)  Subjective functional status/changes:   [] No changes reported  The pt reports fear of pain relapse thus has been avoiding activity    OBJECTIVE    21 min [x]Eval                  []Re-Eval       8 min Therapeutic Exercise:  [] See flow sheet :   Rationale: increase ROM and improve coordination to improve the patients ability to perform ADLs    8 min Therapeutic Activity:  []  See flow sheet :   Rationale: self care and pt education  to improve the patients ability to self manage symptoms and safely return to activity     8 min Manual Therapy:  Left posterior and right anterior innominate MET   The manual therapy interventions were performed at a separate and distinct time from the therapeutic activities interventions.   Rationale: decrease pain and increase tissue extensibility to improve ease of ADLs            With   [] TE   [] TA   [] neuro   [] other: Patient Education: [x] Review HEP    [] Progressed/Changed HEP based on:   [] positioning   [] body mechanics   [] transfers   [] heat/ice application    [] other:      Other Objective/Functional Measures:      Pain Level (0-10 scale) post treatment: 2    ASSESSMENT/Changes in Function: see POC    Patient will continue to benefit from skilled PT services to modify and progress therapeutic interventions, address functional mobility deficits, address ROM deficits, address strength deficits, analyze and address soft tissue restrictions, analyze and cue movement patterns and analyze and modify body mechanics/ergonomics to attain remaining goals. [x]  See Plan of Care  []  See progress note/recertification  []  See Discharge Summary         Progress towards goals / Updated goals:  Short Term Goals: To be accomplished in 2 weeks:  1. Pt will demonstrate I and compliance with HEP to maximize therapeutic effect. IE: HEP issued and instructed  2. Pt will demonstrate symmetrical B hip ER for reduced stress on lumbar spine with exercise. IE: limited left hip ER flexibility  Long Term Goals: To be accomplished in 4 weeks:  1. Pt will demonstrate WNL trunk AROM without pain increase to improve ease of ADLs. IE: limited trunk flexion/extension and right SB  2. Pt will demonstrate 5/5 left hip strength for full return to exercise. IE: 4+/5  3. Pt will demonstrate QP UE/LE extension without back pain or loss of PPT to improve core stability with ADLs. IE: QP not initiated at this time  4. Pt will report little to no limitation with vigorous activity to allow return to PLOF and exercise.    IE: limited a lot    PLAN  []  Upgrade activities as tolerated     [x]  Continue plan of care  []  Update interventions per flow sheet       []  Discharge due to:_  []  Other:_      Rodri Shell DPT, CMTPT 12/30/2020  11:54 AM    Future Appointments   Date Time Provider Marylou Hernandez   12/31/2020  7:45 AM Ailyn Carey, PT [facility]   1/5/2021  7:45 AM Evangelista Cedeno, PTA [facility]   1/7/2021  7:00 AM Ailyn Carey, PT [facility]   1/12/2021  7:45 AM Evangelista Cedeno, PTA [facility]   1/14/2021  7:00 AM Ailyn Carey, PT [facility]   1/19/2021  4:30 PM Li Grater [facility]   1/21/2021  3:45 PM Evangelista Cedeno, PTA [facility]

## 2020-12-30 NOTE — PROGRESS NOTES
[facility]  [address]  [place], [address]  [phone] fax    Plan of Care/ Statement of Necessity for Physical Therapy Services    Patient name: Karen Bah Start of Care: 2020   Referral source: Cayetano Leroy MD : 1975    Medical Diagnosis: Low back pain [M54.5]  Payor: Hector Fees / Plan: [id number] [place] / Product Type: PPO /  Onset Date:2020    Treatment Diagnosis: Lumbosacral pain   Prior Hospitalization: see medical history Provider#: [id number]   Medications: Verified on Patient summary List    Comorbidities: none reported   Prior Level of Function: Pt able to participate in exercise and resistance training without back pain. The Plan of Care and following information is based on the information from the initial evaluation. Assessment/ key information: The pt is a 53 y/o F presenting with c/o left sided LBP that will refer into left LE sometimes as far as her calf. She reports unsure of mechanism but started around Thanksgiving after viewing a home. She had significant pain for a couple of days that improved after a couple of days of rest. She reports no imaging to date and reports variable symptom presentation/provocation at this time. The pt demonstrates no clear directional preference, no provocation of LE symptoms during examination. She does present with pelvic obliquities apparent and increased tone through left lumbar paraspinals. TTP noted at left lumbar paraspinals and left sacral ala. Limited left hip and posterior chain flexibility compared to right. Signs and symptoms appear consistent with mechanical lumbopelvic pain. Pt would benefit from PT to improve mobility, core mechanics and stability to return to PLOF.     Evaluation Complexity History LOW Complexity : Zero comorbidities / personal factors that will impact the outcome / POC; Examination MEDIUM Complexity : 3 Standardized tests and measures addressing body structure, function, activity limitation and / or participation in recreation  ;Presentation LOW Complexity : Stable, uncomplicated  ;Clinical Decision Making MEDIUM Complexity : FOTO score of 26-74  Overall Complexity Rating: LOW   Problem List: pain affecting function, decrease ROM, decrease strength, decrease ADL/ functional abilitiies, decrease activity tolerance and decrease flexibility/ joint mobility   Treatment Plan may include any combination of the following: Therapeutic exercise, Therapeutic activities, Neuromuscular re-education, Physical agent/modality, Manual therapy, Patient education, Self Care training and Functional mobility training  Patient / Family readiness to learn indicated by: asking questions and trying to perform skills  Persons(s) to be included in education: patient (P)  Barriers to Learning/Limitations: None  Patient Goal (s): Get back to working out, back to [email or web address]  Patient Self Reported Health Status: good  Rehabilitation Potential: good    Short Term Goals: To be accomplished in 2 weeks:  1. Pt will demonstrate I and compliance with HEP to maximize therapeutic effect. 2. Pt will demonstrate symmetrical B hip ER for reduced stress on lumbar spine with exercise. Long Term Goals: To be accomplished in 4 weeks:  1. Pt will demonstrate WNL trunk AROM without pain increase to improve ease of ADLs. 2. Pt will demonstrate 5/5 left hip strength for full return to exercise. 3. Pt will demonstrate QP UE/LE extension without back pain or loss of PPT to improve core stability with ADLs. 4. Pt will report little to no limitation with vigorous activity to allow return to PLOF and exercise. Frequency / Duration: Patient to be seen 2 times per week for 4 weeks.     Patient/ Caregiver education and instruction: Diagnosis, prognosis, self care, activity modification and exercises   [x]  Plan of care has been reviewed with LAZARO Gonzales DPT, CMTPT 12/30/2020 10:54 AM    ________________________________________________________________________    I certify that the above Therapy Services are being furnished while the patient is under my care. I agree with the treatment plan and certify that this therapy is necessary.     Physician's Signature:____________Date:_________TIME:________    ** Signature, Date and Time must be completed for valid certification **    Please sign and return to [facility]  [address]  [place], [address]  [phone] fax

## 2020-12-31 ENCOUNTER — HOSPITAL ENCOUNTER (OUTPATIENT)
Dept: PHYSICAL THERAPY | Age: 45
Discharge: HOME | End: 2020-12-31
Payer: COMMERCIAL

## 2020-12-31 PROCEDURE — 97140 MANUAL THERAPY 1/> REGIONS: CPT

## 2020-12-31 PROCEDURE — 97110 THERAPEUTIC EXERCISES: CPT

## 2020-12-31 PROCEDURE — 97112 NEUROMUSCULAR REEDUCATION: CPT

## 2020-12-31 NOTE — PROGRESS NOTES
PT DAILY TREATMENT NOTE 11    Patient Name: Rick Felix  Date:2020  : 1975  [x]  Patient  Verified  Payor: BLUE CROSS / [facility] [place] / Product Type: PPO /    In time:752  Out time:836  Total Treatment Time (min): 44  Visit #: 2 of 8    Medicare/BCBS Only   Total Timed Codes (min):  44 1:1 Treatment Time:  38       Treatment Area: Low back pain [M54.5]    SUBJECTIVE  Pain Level (0-10 scale): 0  Any medication changes, allergies to medications, adverse drug reactions, diagnosis change, or new procedure performed?: [x] No    [] Yes (see summary sheet for update)  Subjective functional status/changes:   [] No changes reported  No pain this morning. Some pain in the hip flexors/groin after LE bike warmup. OBJECTIVE    10 min Therapeutic Exercise:  [x] See flow sheet :   Rationale: increase ROM and increase strength to improve the patients ability to manage ADLs. 20 min Neuromuscular Re-education:  [x]  See flow sheet : PPT series and reformer series. Rationale: increase strength and improve coordination  to improve the patients ability to manage functional activities with reduced pain. 8 min Manual Therapy:  SIJ and innominate alignment check WNL. STM to right lumbar erectors and QL. The manual therapy interventions were performed at a separate and distinct time from the therapeutic activities interventions. Rationale: decrease pain, increase ROM and increase tissue extensibility to improve ease and capacity for ADLs. With   [] TE   [] TA   [] neuro   [] other: Patient Education: [x] Review HEP    [] Progressed/Changed HEP based on:   [] positioning   [] body mechanics   [] transfers   [] heat/ice application    [] other:      Other Objective/Functional Measures: exercises initiated per flowsheet   Pain Level (0-10 scale) post treatment: 0    ASSESSMENT/Changes in Function: Pt performs all exercises as directed, only experiencing pain with LE bike.  SIJ alignment within normal limits. Patient will continue to benefit from skilled PT services to modify and progress therapeutic interventions, address functional mobility deficits, address ROM deficits, address strength deficits, analyze and address soft tissue restrictions, analyze and cue movement patterns, analyze and modify body mechanics/ergonomics, assess and modify postural abnormalities, address imbalance/dizziness and instruct in home and community integration to attain remaining goals. [x]  See Plan of Care  []  See progress note/recertification  []  See Discharge Summary         Progress towards goals / Updated goals:  Short Term Goals: To be accomplished in 2 weeks:  1. Pt will demonstrate I and compliance with HEP to maximize therapeutic effect. IE: HEP issued and instructed  2. Pt will demonstrate symmetrical B hip ER for reduced stress on lumbar spine with exercise. IE: limited left hip ER flexibility  Long Term Goals: To be accomplished in 4 weeks:  1. Pt will demonstrate WNL trunk AROM without pain increase to improve ease of ADLs. IE: limited trunk flexion/extension and right SB  2. Pt will demonstrate 5/5 left hip strength for full return to exercise. IE: 4+/5  3. Pt will demonstrate QP UE/LE extension without back pain or loss of PPT to improve core stability with ADLs. IE: QP not initiated at this time  4. Pt will report little to no limitation with vigorous activity to allow return to PLOF and exercise.               IE: limited a lot    PLAN  []  Upgrade activities as tolerated     [x]  Continue plan of care  []  Update interventions per flow sheet       []  Discharge due to:_  []  Other:_      Cynthia Michaels, PT 12/31/2020  8:07 AM    Future Appointments   Date Time Provider Marylou Hernandez   1/5/2021  7:45 AM Eleazar Delgado, PTA [facility]   1/7/2021  7:00 AM Lisa Olivo, PT [facility]   1/12/2021  7:45 AM Gabby Mejia, PTA [facility]   1/14/2021  7:00 AM Tawanna Nunes, PT [facility]   1/19/2021  4:30 PM Esther Duarte [facility]   1/21/2021  3:45 PM Gabby Mejia, PTA [facility]

## 2021-01-05 ENCOUNTER — HOSPITAL ENCOUNTER (OUTPATIENT)
Dept: PHYSICAL THERAPY | Age: 46
Discharge: HOME | End: 2021-01-05
Payer: COMMERCIAL

## 2021-01-05 PROCEDURE — 97112 NEUROMUSCULAR REEDUCATION: CPT

## 2021-01-05 PROCEDURE — 97140 MANUAL THERAPY 1/> REGIONS: CPT

## 2021-01-05 PROCEDURE — 97110 THERAPEUTIC EXERCISES: CPT

## 2021-01-05 NOTE — PROGRESS NOTES
PT DAILY TREATMENT NOTE     Patient Name: Stephani Sood  Date:2021  : 1975  [x]  Patient  Verified  Payor: BLUE CROSS / Plan: [facility] [place] / Product Type: PPO /    In time:7:46  Out time:8:25  Total Treatment Time (min): 39  Visit #: 3 of 8    Medicare/BCBS Only   Total Timed Codes (min):  39 1:1 Treatment Time:  39       Treatment Area: Low back pain [M54.5]    SUBJECTIVE  Pain Level (0-10 scale): 0/10  Any medication changes, allergies to medications, adverse drug reactions, diagnosis change, or new procedure performed?: [x] No    [] Yes (see summary sheet for update)  Subjective functional status/changes:   [] No changes reported  Pt reports no pain currently. OBJECTIVE    21 min Therapeutic Exercise:  [x] See flow sheet :   Rationale: increase ROM and increase strength to improve the patients ability to perform ADLs. 10 min Neuromuscular Re-education:  [x]  See flow sheet :   Rationale: increase strength, improve coordination and increase proprioception  to improve the patients ability to perform ADLs with increased. 8 min Manual Therapy:  DTM to bilateral lumbar paraspinals and QL. The manual therapy interventions were performed at a separate and distinct time from the therapeutic activities interventions. Rationale: decrease pain, increase ROM and increase tissue extensibility to perform ADLs with increased ease. With   [] TE   [] TA   [] neuro   [] other: Patient Education: [x] Review HEP    [] Progressed/Changed HEP based on:   [] positioning   [] body mechanics   [] transfers   [] heat/ice application    [] other:      Other Objective/Functional Measures: added exercises as per flow sheet. Pain Level (0-10 scale) post treatment: 0/10    ASSESSMENT/Changes in Function: Pt has no increased pain post treatment. Pt demonstrates good control with PPT.      Patient will continue to benefit from skilled PT services to modify and progress therapeutic interventions, address functional mobility deficits, address ROM deficits, address strength deficits, analyze and address soft tissue restrictions, analyze and cue movement patterns and analyze and modify body mechanics/ergonomics to attain remaining goals. []  See Plan of Care  []  See progress note/recertification  []  See Discharge Summary         Progress towards goals / Updated goals:  Short Term Goals: To be accomplished in 2 weeks:  1. Pt will demonstrate I and compliance with HEP to maximize therapeutic effect.              IE: HEP issued and instructed  2. Pt will demonstrate symmetrical B hip ER for reduced stress on lumbar spine with exercise.              IE: limited left hip ER flexibility  Long Term Goals: To be accomplished in 4 weeks:  1. Pt will demonstrate WNL trunk AROM without pain increase to improve ease of ADLs.             ZJ: limited trunk flexion/extension and right SB  2. Pt will demonstrate 5/5 left hip strength for full return to exercise.              IE: 4+/5  3. Pt will demonstrate QP UE/LE extension without back pain or loss of PPT to improve core stability with ADLs.             ZF: QP not initiated at this time  4.  Pt will report little to no limitation with vigorous activity to allow return to PLOF and exercise.              IE: limited a lot    PLAN  []  Upgrade activities as tolerated     [x]  Continue plan of care  []  Update interventions per flow sheet       []  Discharge due to:_  []  Other:_      Marichuy Zambrano PTA 1/5/2021  7:51 AM    Future Appointments   Date Time Provider Marylou Hernandez   1/7/2021  7:00 AM Rosa Leon PT [facility]   1/12/2021  7:45 AM Bryant Ray PTA [facility]   1/14/2021  7:00 AM Rosa Leon PT [facility]   1/19/2021  4:30 PM Kaia Marcelo [facility]   1/21/2021  3:45 PM Bryant Ray PTA [facility]

## 2021-01-07 ENCOUNTER — APPOINTMENT (OUTPATIENT)
Dept: PHYSICAL THERAPY | Age: 46
End: 2021-01-07
Payer: COMMERCIAL

## 2021-01-12 ENCOUNTER — HOSPITAL ENCOUNTER (OUTPATIENT)
Dept: PHYSICAL THERAPY | Age: 46
Discharge: HOME | End: 2021-01-12
Payer: COMMERCIAL

## 2021-01-12 PROCEDURE — 97140 MANUAL THERAPY 1/> REGIONS: CPT

## 2021-01-12 PROCEDURE — 97112 NEUROMUSCULAR REEDUCATION: CPT

## 2021-01-12 PROCEDURE — 97110 THERAPEUTIC EXERCISES: CPT

## 2021-01-12 NOTE — PROGRESS NOTES
PT DAILY TREATMENT NOTE     Patient Name: Nehal [place]  Date:2021  : 1975  [x]  Patient  Verified  Payor: BLUE CROSS / Plan: [employer] [facility] [place] / Product Type: PPO /    In time:7:48  Out time:8:26  Total Treatment Time (min): 38  Visit #: 4 of 8    Medicare/BCBS Only   Total Timed Codes (min):  38 1:1 Treatment Time:  38       Treatment Area: Low back pain [M54.5]    SUBJECTIVE  Pain Level (0-10 scale): 0/10  Any medication changes, allergies to medications, adverse drug reactions, diagnosis change, or new procedure performed?: [x] No    [] Yes (see summary sheet for update)  Subjective functional status/changes:   [] No changes reported  Pt denies pain. Pt reports continued compliance with HEP. OBJECTIVE    20 min Therapeutic Exercise:  [x] See flow sheet :   Rationale: increase ROM and increase strength to improve the patients ability to perform ADLs with increased ease. 10 min Neuromuscular Re-education:  [x]  See flow sheet :   Rationale: increase ROM and increase strength  to improve the patients ability to perform ADLs with increased ease. 8 min Manual Therapy:  Neutral pelvic alignment. DTM to bilateral lumbar paraspinals and QL   The manual therapy interventions were performed at a separate and distinct time from the therapeutic activities interventions. Rationale: decrease pain, increase ROM and increase tissue extensibility to improve tolerance to ADLs with increased ease. With   [] TE   [] TA   [] neuro   [] other: Patient Education: [x] Review HEP    [] Progressed/Changed HEP based on:   [] positioning   [] body mechanics   [] transfers   [] heat/ice application    [] other:      Other Objective/Functional Measures: Added PPT with LE 90/90 toe touch. Pain Level (0-10 scale) post treatment: 0/10    ASSESSMENT/Changes in Function: Pt has good core control with all core strengthening exercises. Pt has no increased symptoms with treatment.      Patient will continue to benefit from skilled PT services to modify and progress therapeutic interventions, address functional mobility deficits, address ROM deficits, address strength deficits, analyze and address soft tissue restrictions, analyze and cue movement patterns and analyze and modify body mechanics/ergonomics to attain remaining goals. []  See Plan of Care  []  See progress note/recertification  []  See Discharge Summary         Progress towards goals / Updated goals:  Short Term Goals: To be accomplished in 2 weeks:  1. Pt will demonstrate I and compliance with HEP to maximize therapeutic effect.              IE: HEP issued and instructed   CUrrent: met per patient report. 1/12/2020  2. Pt will demonstrate symmetrical B hip ER for reduced stress on lumbar spine with exercise.              IE: limited left hip ER flexibility  Long Term Goals: To be accomplished in 4 weeks:  1. Pt will demonstrate WNL trunk AROM without pain increase to improve ease of ADLs.             RJ: limited trunk flexion/extension and right SB  2. Pt will demonstrate 5/5 left hip strength for full return to exercise.              IE: 4+/5  3. Pt will demonstrate QP UE/LE extension without back pain or loss of PPT to improve core stability with ADLs.             ZP: QP not initiated at this time  4. Pt will report little to no limitation with vigorous activity to allow return to PLOF and exercise.              IE: limited a lot   Current: pt reports she hasn't been having pain recently with daily activities.      PLAN  []  Upgrade activities as tolerated     [x]  Continue plan of care  []  Update interventions per flow sheet       []  Discharge due to:_  []  Other:_      Yenni Torre PTA 1/12/2021  8:01 AM    Future Appointments   Date Time Provider Marylou Hernandez   1/14/2021  7:00 AM Arjun Gray, PT [facility]   [facility]   1/21/2021  3:45 PM Dorla Riedel, PTA [facility]

## 2021-01-14 ENCOUNTER — HOSPITAL ENCOUNTER (OUTPATIENT)
Dept: PHYSICAL THERAPY | Age: 46
Discharge: HOME | End: 2021-01-14
Payer: COMMERCIAL

## 2021-01-14 PROCEDURE — 97140 MANUAL THERAPY 1/> REGIONS: CPT

## 2021-01-14 PROCEDURE — 97112 NEUROMUSCULAR REEDUCATION: CPT

## 2021-01-14 PROCEDURE — 97110 THERAPEUTIC EXERCISES: CPT

## 2021-01-14 NOTE — PROGRESS NOTES
PT DAILY TREATMENT NOTE 11    Patient Name: Chaitanya Hilton  Date:2021  : 1975  [x]  Patient  Verified  Payor: BLUE CROSS / Plan: [employer] [facility] / Product Type: PPO /    In time:702  Out time:746  Total Treatment Time (min): 44  Visit #: 5 of 8    Medicare/BCBS Only   Total Timed Codes (min):  44 1:1 Treatment Time:  39       Treatment Area: Low back pain [M54.5]    SUBJECTIVE  Pain Level (0-10 scale): 0  Any medication changes, allergies to medications, adverse drug reactions, diagnosis change, or new procedure performed?: [x] No    [] Yes (see summary sheet for update)  Subjective functional status/changes:   [] No changes reported  Has not had any pain in the last week. Some left hip flexor pain following recumbent cycle warm-up. OBJECTIVE    11 min Therapeutic Exercise:  [x] See flow sheet :   Rationale: increase ROM and increase strength to improve the patients ability to manage ADLs. 20 min Neuromuscular Re-education:  [x]  See flow sheet : barrel 1/2 prone gluteal activation; reformer exercise series; PPT series. Rationale: increase strength, improve coordination and increase proprioception  to improve the patients ability to manage ADLs with improved lumbar stability and reduced pain referral.    8 min Manual Therapy:  STM to lumbar paraspinals and B QL; pelvic alignment check with level PSIS and ASIS B, no leg length change supine to sit. The manual therapy interventions were performed at a separate and distinct time from the therapeutic activities interventions. Rationale: decrease pain, increase ROM and increase tissue extensibility to improve ease of self care management.             With   [] TE   [] TA   [] neuro   [] other: Patient Education: [x] Review HEP    [] Progressed/Changed HEP based on:   [] positioning   [] body mechanics   [] transfers   [] heat/ice application    [] other:      Other Objective/Functional Measures: no demonstrated pelvic obliquties     Pain Level (0-10 scale) post treatment: 0    ASSESSMENT/Changes in Function: Pt is progressing well with therapeutic exercises and abdominal stability program. Is able to maintain PPT on reformer in supine and with bridging but has difficulty maintaining against gravity in quadruped positioning. Continuing to have some pain referral to the hip flexors/groin of the left hip. Patient will continue to benefit from skilled PT services to modify and progress therapeutic interventions, address functional mobility deficits, address ROM deficits, address strength deficits, analyze and address soft tissue restrictions, analyze and cue movement patterns, analyze and modify body mechanics/ergonomics, assess and modify postural abnormalities, address imbalance/dizziness and instruct in home and community integration to attain remaining goals. []  See Plan of Care  []  See progress note/recertification  []  See Discharge Summary         Progress towards goals / Updated goals:  Short Term Goals: To be accomplished in 2 weeks:  1. Pt will demonstrate I and compliance with HEP to maximize therapeutic effect.              IE: HEP issued and instructed              Current: met per patient report. 1/12/2020  2. Pt will demonstrate symmetrical B hip ER for reduced stress on lumbar spine with exercise.              IE: limited left hip ER flexibility   Current: progressing, prone hip ER 65 degrees B, with discomfort at end range for the left LE referring to the anterior-superior hip  (1/14/2021)  Long Term Goals: To be accomplished in 4 weeks:  1. Pt will demonstrate WNL trunk AROM without pain increase to improve ease of ADLs.             FX: limited trunk flexion/extension and right SB  2. Pt will demonstrate 5/5 left hip strength for full return to exercise.              IE: 4+/5  3. Pt will demonstrate QP UE/LE extension without back pain or loss of PPT to improve core stability with ADLs.               WM: QP not initiated at this time   Current: progressing, initiated QP alternating LE with pt having difficulty maintaining neutral pelvis (1/14/2021)  4. Pt will report little to no limitation with vigorous activity to allow return to PLOF and exercise.              IE: limited a lot              Current: pt reports she hasn't been having pain recently with daily activities.      PLAN  []  Upgrade activities as tolerated     [x]  Continue plan of care  []  Update interventions per flow sheet       []  Discharge due to:_   []  Other:_      General Pam, PT 1/14/2021  7:10 AM    Future Appointments   Date Time Provider Marylou Hernandez   1/19/2021  4:30 PM Jelani [address] [facility]   1/21/2021  3:45 PM [facility]

## 2021-01-19 ENCOUNTER — HOSPITAL ENCOUNTER (OUTPATIENT)
Dept: PHYSICAL THERAPY | Age: 46
Discharge: HOME | End: 2021-01-19
Payer: COMMERCIAL

## 2021-01-19 PROCEDURE — 97140 MANUAL THERAPY 1/> REGIONS: CPT

## 2021-01-19 PROCEDURE — 97112 NEUROMUSCULAR REEDUCATION: CPT

## 2021-01-19 PROCEDURE — 97110 THERAPEUTIC EXERCISES: CPT

## 2021-01-19 NOTE — PROGRESS NOTES
PT DAILY TREATMENT NOTE     Patient Name: Jacqueline Franklin  Date:2021  : 1975  [x]  Patient  Verified  Payor: BLUE CROSS / Plan: [employer] [facility] [place] / Product Type: PPO /    In time:4:30  Out time:5:12  Total Treatment Time (min): 42  Visit #: 6 of 8    Medicare/BCBS Only   Total Timed Codes (min):  42 1:1 Treatment Time:  42       Treatment Area: Low back pain [M54.5]    SUBJECTIVE  Pain Level (0-10 scale): 0  Any medication changes, allergies to medications, adverse drug reactions, diagnosis change, or new procedure performed?: [x] No    [] Yes (see summary sheet for update)  Subjective functional status/changes:   [] No changes reported  The pt denies any pain upon arrival reports getting an intermittent \"sensation\" through left anterior hip with bike and exercise    OBJECTIVE    Modality rationale: PD to improve the patients ability to    Min Type Additional Details    [] Estim:  []Unatt       []IFC  []Premod                        []Other:  []w/ice   []w/heat  Position:  Location:    [] Estim: []Att    []TENS instruct  []NMES                    []Other:  []w/US   []w/ice   []w/heat  Position:  Location:    []  Traction: [] Cervical       []Lumbar                       [] Prone          []Supine                       []Intermittent   []Continuous Lbs:  [] before manual  [] after manual    []  Ultrasound: []Continuous   [] Pulsed                           []1MHz   []3MHz W/cm2:  Location:    []  Iontophoresis with dexamethasone         Location: [] Take home patch   [] In clinic    []  Ice     []  heat  []  Ice massage  []  Laser   []  Anodyne Position:  Location:    []  Laser with stim  []  Other:  Position:  Location:    []  Vasopneumatic Device Pressure:       [] lo [] med [] hi   Temperature: [] lo [] med [] hi   [] Skin assessment post-treatment:  []intact []redness- no adverse reaction    []redness  adverse reaction:       17 min Therapeutic Exercise:  [] See flow sheet : including pt education/discussion   Rationale: increase ROM and increase strength to improve the patients ability to perform daily tasks and self care    15 min Neuromuscular Re-education:  []  See flow sheet :   Rationale: increase strength, improve coordination and increase proprioception  to improve the patients ability to return to exercise routine with improved stability and core endurance    10 min Manual Therapy:  SIJ assessment w/ slight anterior innominate left, left hip mobs with mulligan strap inferior and lateral with IR PROM   The manual therapy interventions were performed at a separate and distinct time from the therapeutic activities interventions. Rationale: decrease pain and increase tissue extensibility to improve ease of ADL performance             With   [] TE   [] TA   [] neuro   [] other: Patient Education: [x] Review HEP    [] Progressed/Changed HEP based on:   [] positioning   [] body mechanics   [] transfers   [] heat/ice application    [] other:      Other Objective/Functional Measures: pt reporting anterior hip discomfort reproduced with FADDIR position and end range hip IR PROM, mild TTP hip flexors     Pain Level (0-10 scale) post treatment: 0    ASSESSMENT/Changes in Function: The pt demonstrates improved trunk mobility since [facility] and pain has been well controlled. She reports localized left sacral and anterior hip discomfort intermittently. Good response to today's therex without provocation of anterior hip discomfort. Advised she may trial a light LE workout focusing mostly on B LE simultaneous work to assess response.  Continue with strength and lumbopelvic stability work     Patient will continue to benefit from skilled PT services to modify and progress therapeutic interventions, address functional mobility deficits, address ROM deficits, address strength deficits, analyze and address soft tissue restrictions, analyze and cue movement patterns and analyze and modify body mechanics/ergonomics to attain remaining goals. []  See Plan of Care  []  See progress note/recertification  []  See Discharge Summary         Progress towards goals / Updated goals:  Short Term Goals: To be accomplished in 2 weeks:  1. Pt will demonstrate I and compliance with HEP to maximize therapeutic effect.              ZT: HEP issued and instructed              [id number]: met per patient report. 1/12/2020  2. Pt will demonstrate symmetrical B hip ER for reduced stress on lumbar spine with exercise.              IE: limited left hip ER flexibility              Current: progressing, prone hip ER 65 degrees B, with discomfort at end range for the left LE referring to the anterior-superior hip  (1/14/2021)  Long Term Goals: To be accomplished in 4 weeks:  1. Pt will demonstrate WNL trunk AROM without pain increase to improve ease of ADLs.             JX: limited trunk flexion/extension and right SB   Current: Met WNL trunk motion without pain 1/19/2021  2. Pt will demonstrate 5/5 left hip strength for full return to exercise.              IE: 4+/5   Current: near met 5/5 abd 4+/5 ext 1/19/2021  3. Pt will demonstrate QP UE/LE extension without back pain or loss of PPT to improve core stability with ADLs.             SJ: QP not initiated at this time              Current: progressing, initiated QP alternating LE with pt having difficulty maintaining neutral pelvis (1/14/2021)  4.  Pt will report little to no limitation with vigorous activity to allow return to PLOF and exercise.              IE: limited a lot              Current: pt reports she hasn't been having pain recently with daily activities.     PLAN  []  Upgrade activities as tolerated     []  Continue plan of care  []  Update interventions per flow sheet       []  Discharge due to:_  []  Other:_      Erika Whelan DPT, CMTPT 1/19/2021  4:34 PM    Future Appointments   Date Time Provider Marylou Hernandez   1/21/2021  3:45 PM Satish Singleton, PTA [id number]

## 2021-01-21 ENCOUNTER — HOSPITAL ENCOUNTER (OUTPATIENT)
Dept: PHYSICAL THERAPY | Age: 46
Discharge: HOME | End: 2021-01-21
Payer: COMMERCIAL

## 2021-01-21 PROCEDURE — 97110 THERAPEUTIC EXERCISES: CPT

## 2021-01-21 PROCEDURE — 97112 NEUROMUSCULAR REEDUCATION: CPT

## 2021-01-21 PROCEDURE — 97530 THERAPEUTIC ACTIVITIES: CPT

## 2021-01-21 NOTE — PROGRESS NOTES
PT DAILY TREATMENT NOTE     Patient Name: Neel Garay  Date:2021  : 1975  [x]  Patient  Verified  Payor: BLUE CROSS / Plan: [employer] [facility] / Product Type: PPO /    In time: 3:40 PM  Out time: 4:30 PM  Total Treatment Time (min): 40  Visit #: 7 of 8    Medicare/BCBS Only   Total Timed Codes (min):  40 1:1 Treatment Time:  40       Treatment Area: Low back pain [M54.5]    SUBJECTIVE  Pain Level (0-10 scale): 0  Any medication changes, allergies to medications, adverse drug reactions, diagnosis change, or new procedure performed?: [x] No    [] Yes (see summary sheet for update)  Subjective functional status/changes:   [] No changes reported  Has not had any difficulty with daily tasks or recreational tasks. Attempted a lower body weight lifting workout two days ago and has had no difficulty during or since. States she feels she can manage at home independently. OBJECTIVE      10 min Therapeutic Exercise:  [x] See flow sheet :   Rationale: increase ROM, increase strength and improve coordination to improve the patients ability to maintain lumbopelvic stability with daily tasks. 18 min Therapeutic Activity:  []  See flow sheet : Extensive education and blocked practice of squat, deadlift, and lunge movement patterns with emphasis on reducing ribcage flare and lumbopelvic stability to facilitate patient return to regular exercise. Rationale: increase strength, improve coordination and increase proprioception  to improve the patients ability to return to regular exercise without difficulty     12 min Neuromuscular Re-education:  [x]  See flow sheet :   Rationale: increase strength and improve coordination  to improve the patients ability to maintain core stability with functional tasks.     With   [] TE   [] TA   [] neuro   [] other: Patient Education: [x] Review HEP    [] Progressed/Changed HEP based on:   [] positioning   [] body mechanics   [] transfers   [] heat/ice application [] other:      Other Objective/Functional Measures: See Goals - Current status     Pain Level (0-10 scale) post treatment: 0    ASSESSMENT/Changes in Function: Patient has made excellent progress towards meeting all goals with minimal limitation continuing to be observed in left hip rotational mobility. Functionally, she has improved in capacity as noted by FOTO score of 82 (originally 65) indicating she is functioning near PLOF. She still has not returned to full exercise regimen of squats, deadlifts, and lunges though demonstrates good understanding of core recruitment and proper lumbopelvic rhythm. The patient states she feels she can manage independently at home with HEP and progressive return to previous exercise capacity. She has been provided a comprehensive HEP with progressions and demonstrates understanding. She departs the clinic with no concerns and stating she has had all questions answered. []  See Plan of Care  []  See progress note/recertification  [x]  See Discharge Summary         Progress towards goals / Updated goals:  Short Term Goals: To be accomplished in 2 weeks:  1. Pt will demonstrate I and compliance with HEP to maximize therapeutic effect.              UY: HEP issued and instructed              ZAYDA: MET -  per patient report. 1/12/2020  2. Pt will demonstrate symmetrical B hip ER for reduced stress on lumbar spine with exercise.              IE: limited left hip ER flexibility              Current: 1/21/2021 - Progressed - left hip ER 57 degrees, right hip ER 65 degrees  Long Term Goals: To be accomplished in 4 weeks:  1. Pt will demonstrate WNL trunk AROM without pain increase to improve ease of ADLs.             VW: limited trunk flexion/extension and right SB              Current: MET -  WNL trunk motion without pain 1/19/2021  2.  Pt will demonstrate 5/5 left hip strength for full return to exercise.              IE: 4+/5              Current: MET - 1/21/2021 -  5/5 globally  3. Pt will demonstrate QP UE/LE extension without back pain or loss of PPT to improve core stability with ADLs.             PT: QP not initiated at this time              Current: 1/21/2021 - MET - demonstrates QP UE/LE with lumbopelvic stability, no pain  4. Pt will report little to no limitation with vigorous activity to allow return to PLOF and exercise.              IE: limited a lot              Current: 1/21/2021 - Progressed - limited a little       PLAN  []  Upgrade activities as tolerated     []  Continue plan of care  []  Update interventions per flow sheet       [x]  Discharge due to: is meeting/progressing towards goals  []  Other:_      Becca Parsons, PT 1/21/2021  3:52 PM    No future appointments.

## 2021-01-21 NOTE — PROGRESS NOTES
[facility]  [address] [facility] [address]  [place], [address]  [phone] fax    Physical Therapy Discharge Summary  Patient name: Chaitanya Hilton Start of Care: 2020   Referral source: Sabina Landers MD : 1975                Medical Diagnosis: Low back pain [M54.5]  Payor: Surendra Brush / Plan: [facility] / Product Type: PPO /  Onset Date:2020                Treatment Diagnosis: Lumbosacral pain   Prior Hospitalization: see medical history Provider#: [id number]   Medications: Verified on Patient summary List    Comorbidities: none reported   Prior Level of Function: Pt able to participate in exercise and resistance training without back pain. Visits from Start of Care: 7    Missed Visits: 0  Reporting Period : 2020 to 2021      Summary of Care:  Progress towards goals / Updated goals:  Short Term Goals: To be accomplished in 2 weeks:  1. Pt will demonstrate I and compliance with HEP to maximize therapeutic effect.              BF: HEP issued and instructed              YZ: MET -  per patient report. 2020  2. Pt will demonstrate symmetrical B hip ER for reduced stress on lumbar spine with exercise.              IE: limited left hip ER flexibility              DC: 2021 - Progressed - left hip ER 57 degrees, right hip ER 65 degrees  Long Term Goals: To be accomplished in 4 weeks:  1. Pt will demonstrate WNL trunk AROM without pain increase to improve ease of ADLs.             LQ: limited trunk flexion/extension and right SB              DC: MET -  WNL trunk motion without pain 2021  2. Pt will demonstrate 5/5 left hip strength for full return to exercise.              IE: 4+/5              DC: MET - 2021 -  5/5 globally  3. Pt will demonstrate QP UE/LE extension without back pain or loss of PPT to improve core stability with ADLs.               MO: QP not initiated at this time              DC: 2021 - MET - demonstrates QP UE/LE with lumbopelvic stability, no pain  4. Pt will report little to no limitation with vigorous activity to allow return to PLOF and exercise.              IE: limited a lot              DC: 1/21/2021 - Progressed - limited a little      ASSESSMENT/RECOMMENDATIONS:    Patient has made excellent progress towards meeting all goals with minimal limitation continuing to be observed in left hip rotational mobility. Functionally, she has improved in capacity as noted by FOTO score of 82 (originally 65) indicating she is functioning near PLOF. She still has not returned to full exercise regimen of squats, deadlifts, and lunges though demonstrates good understanding of core recruitment and proper lumbopelvic rhythm. The patient states she feels she can manage independently at home with HEP and progressive return to previous exercise capacity. She has been provided a comprehensive HEP with progressions and demonstrates understanding. She departs the clinic with no concerns and stating she has had all questions answered.      [x]Discontinue therapy: [x]Patient has reached or is progressing toward set goals      []Patient is non-compliant or has abdicated      []Due to lack of appreciable progress towards set goals    Tami Jay, PT 1/21/2021 4:50 PM

## 2024-11-01 ENCOUNTER — OFFICE VISIT (OUTPATIENT)
Age: 49
End: 2024-11-01

## 2024-11-01 VITALS
DIASTOLIC BLOOD PRESSURE: 8 MMHG | HEIGHT: 63 IN | BODY MASS INDEX: 28.99 KG/M2 | OXYGEN SATURATION: 98 % | SYSTOLIC BLOOD PRESSURE: 129 MMHG | HEART RATE: 71 BPM | WEIGHT: 163.6 LBS

## 2024-11-01 DIAGNOSIS — Z71.3 ENCOUNTER FOR DIETARY COUNSELING AND SURVEILLANCE: ICD-10-CM

## 2024-11-01 DIAGNOSIS — Z13.29 SCREENING FOR THYROID DISORDER: ICD-10-CM

## 2024-11-01 DIAGNOSIS — Z71.3 ENCOUNTER FOR WEIGHT LOSS COUNSELING: ICD-10-CM

## 2024-11-01 DIAGNOSIS — E66.3 OVERWEIGHT WITH BODY MASS INDEX (BMI) OF 28 TO 28.9 IN ADULT: Primary | ICD-10-CM

## 2024-11-01 DIAGNOSIS — Z13.1 SCREENING FOR DIABETES MELLITUS: ICD-10-CM

## 2024-11-01 ASSESSMENT — ENCOUNTER SYMPTOMS
RESPIRATORY NEGATIVE: 1
GASTROINTESTINAL NEGATIVE: 1

## 2024-11-01 NOTE — PROGRESS NOTES
Chief Complaint   Patient presents with    New Patient     Medical weight loss        Medical Weight Loss Progress Note: Initial Evaluation    Jazzy Collins is a 49 y.o. female whose Body mass index is 28.98 kg/m². She is here for her Initial Evaluation for medical bariatric care. Mrs. Collins presents today with interest in the meal replacements to lose weight. She wants to get to a healthier weight and be able to maintain it. She started Qsymia, prescribed by her PCP, in July and finished in October, where she lost 11 lbs. Mrs. Collins reports that she has been wearing a CPAP since 2018/2019. Patient states that she walks 3-5 miles 5 days a week and tries to avoid processed foods.    Assessment & Plan   Based on her history and exam, Jazzy Collins is a good candidate for the [facility] Program. Her diet seems to be well balanced. She will start the LCD, continue with her current exercise routine and implement weight lifting in the future. She does not desire to try any other weight loss medications.    -Start the LCD with 1 grocery meal and 1 grocery snack  -Continue current exercise routine  -Labs: Vitamin D/B1/B12, TSH, CMP, CBC, lipid panel, HgB A1C  -Maintain 64 ounces of fluid or more a day    1. Overweight with body mass index (BMI) of 28 to 28.9 in adult  -     CBC with Auto Differential; Future  -     Comprehensive Metabolic Panel; Future  -     Hemoglobin A1C; Future  -     Lipid Panel; Future  -     TSH; Future  -     Vitamin B1, Whole Blood; Future  -     Vitamin B12 & Folate; Future  -     Vitamin D 25 Hydroxy; Future  2. Encounter for dietary counseling and surveillance  3. Encounter for weight loss counseling  4. Screening for diabetes mellitus  -     Hemoglobin A1C; Future  5. Screening for thyroid disorder  -     TSH; Future     We agreed upon to work toward a weight goal weight of 135 lbs.   Diet Plan: [facility] Low Calorie Diet with 2 meal replacements  Activity:  walking at

## 2024-11-04 ENCOUNTER — CLINICAL DOCUMENTATION (OUTPATIENT)
Age: 49
End: 2024-11-04

## 2024-11-04 NOTE — PROGRESS NOTES
Enrollment in [facility] Medical Weight Loss program is Confirmed.   - Do Program fees apply? Yes, Patient has NOT had SWL at [facility] facility.).  - Initial Enrollment Program fee: PAYMENT RECEIVED  - Annual Renewal Fee applies every November    Upon enrollment patient has access to:  - Followup Visits with L Provider.  - My Healthy Journey [employer] account.  - Balance Smart Body Comp Scale.  - [employer] In Person educational classes.  - [facility] Prescription Prior Authorization submissions, if applicable.  - Access to Meal Replacements, if applicable.

## 2024-12-05 ENCOUNTER — OFFICE VISIT (OUTPATIENT)
Age: 49
End: 2024-12-05
Payer: COMMERCIAL

## 2024-12-05 VITALS
HEIGHT: 63 IN | BODY MASS INDEX: 29.23 KG/M2 | HEART RATE: 75 BPM | DIASTOLIC BLOOD PRESSURE: 72 MMHG | WEIGHT: 165 LBS | SYSTOLIC BLOOD PRESSURE: 104 MMHG

## 2024-12-05 DIAGNOSIS — Z71.3 ENCOUNTER FOR WEIGHT LOSS COUNSELING: ICD-10-CM

## 2024-12-05 DIAGNOSIS — E66.3 OVERWEIGHT WITH BODY MASS INDEX (BMI) OF 29 TO 29.9 IN ADULT: ICD-10-CM

## 2024-12-05 DIAGNOSIS — Z71.3 ENCOUNTER FOR DIETARY COUNSELING AND SURVEILLANCE: Primary | ICD-10-CM

## 2024-12-05 PROCEDURE — 99213 OFFICE O/P EST LOW 20 MIN: CPT

## 2024-12-05 ASSESSMENT — ENCOUNTER SYMPTOMS
GASTROINTESTINAL NEGATIVE: 1
RESPIRATORY NEGATIVE: 1

## 2024-12-05 NOTE — PROGRESS NOTES
Chief Complaint   Patient presents with    Follow-up     States feels bad due to weight gain     [facility] Nurse Note:       CC: Weight Management    Jazzy Collins is a 49 y.o. female who is here for her f/up medical provider visit for the LCD program.       Did you have any problems adhering to the program since last visit? No  If yes, please explain:     Since your last visit, have you experienced any complications? No    Have you received any other medical care since last visit? No  If yes, where and for what?     Have you had any change in your medications since your last visit? No If yes what?     Are you taking an anti-obesity medication? No If yes what and are you experiencing any side effects?      Would you still like to continue your current medication and dosage?  Taking none    BP Readings from Last 3 Encounters:   11/01/24 (!) 129/8        Eating Habits Over Last Week:    How many oz of sugar-free fluids are you consuming? 80 min    Did you consume your prescribed meal replacement regimen each day? Yes    Physical Activity Routine:    Aerobic exercise: walk 5 miles daily    Resistance exercise: no  
Outpatient Medications   Medication Sig Dispense Refill    Multiple Vitamin (MULTIVITAMIN PO) Take by mouth       No current facility-administered medications for this visit.       No Known Allergies    Social History     Tobacco Use    Smoking status: Never    Smokeless tobacco: Never   Substance Use Topics    Alcohol use: Yes    Drug use: No       Family History   Problem Relation Age of Onset    Heart Disease Maternal Grandmother     Hypertension Maternal Grandmother     Diabetes Father     Stroke Maternal Grandmother     Elevated Lipids Maternal Grandmother        Family Status   Relation Name Status    MGM  (Not Specified)    Father  (Not Specified)   No partnership data on file       Review of Systems  Review of Systems   Constitutional:  Negative for appetite change and fatigue.   Respiratory: Negative.     Cardiovascular: Negative.    Gastrointestinal: Negative.    Musculoskeletal: Negative.    Skin: Negative.    Neurological: Negative.    Psychiatric/Behavioral: Negative.       Objective  Vitals:    12/05/24 0839   BP: 104/72   Pulse: 75   Weight: 74.8 kg (165 lb)   Height: 1.6 m (5' 3\")      No LMP recorded.    Physical Exam  Physical Exam  Constitutional:       Appearance: Normal appearance. She is obese.   HENT:      Head: Normocephalic and atraumatic.   Pulmonary:      Effort: Pulmonary effort is normal.   Musculoskeletal:         General: Normal range of motion.      Cervical back: Normal range of motion and neck supple.   Neurological:      General: No focal deficit present.      Mental Status: She is alert and oriented to person, place, and time. Mental status is at baseline.   Psychiatric:         Mood and Affect: Mood normal.         Behavior: Behavior normal.         Thought Content: Thought content normal.         Judgment: Judgment normal.         No results found for this or any previous visit (from the past 672 hour(s)).    Assessment / Plan  Encounter Diagnoses   Name Primary?    Encounter

## 2024-12-06 ENCOUNTER — CLINICAL DOCUMENTATION (OUTPATIENT)
Facility: HOSPITAL | Age: 49
End: 2024-12-06

## 2024-12-06 NOTE — PROGRESS NOTES
12/6/24:  Patient is on LCD program.  Doing well.  Patient gained 1 pound over Thanksgiving.  I reached out to her to give her information on the macronutrients to aim for keeping her protein at 60-90 grams a day, fat less than 50 grams per day, and carbohydrates less than 50 grams per day. I looked at patient's My Active Journey diet history and she is doing well with following this.  Patient states she is back on track and will reach out with any additional questions.    Inna Sotelo MS RD

## 2025-02-27 ENCOUNTER — OFFICE VISIT (OUTPATIENT)
Age: 50
End: 2025-02-27
Payer: COMMERCIAL

## 2025-02-27 VITALS
OXYGEN SATURATION: 98 % | BODY MASS INDEX: 30.26 KG/M2 | HEART RATE: 72 BPM | WEIGHT: 170.8 LBS | SYSTOLIC BLOOD PRESSURE: 104 MMHG | RESPIRATION RATE: 18 BRPM | HEIGHT: 63 IN | DIASTOLIC BLOOD PRESSURE: 70 MMHG | TEMPERATURE: 97 F

## 2025-02-27 DIAGNOSIS — E66.811 CLASS 1 OBESITY WITHOUT SERIOUS COMORBIDITY WITH BODY MASS INDEX (BMI) OF 30.0 TO 30.9 IN ADULT, UNSPECIFIED OBESITY TYPE: Primary | ICD-10-CM

## 2025-02-27 DIAGNOSIS — Z71.3 ENCOUNTER FOR DIETARY COUNSELING AND SURVEILLANCE: ICD-10-CM

## 2025-02-27 DIAGNOSIS — Z71.3 ENCOUNTER FOR WEIGHT LOSS COUNSELING: ICD-10-CM

## 2025-02-27 PROCEDURE — 99213 OFFICE O/P EST LOW 20 MIN: CPT

## 2025-02-28 ENCOUNTER — TELEPHONE (OUTPATIENT)
Age: 50
End: 2025-02-28

## 2025-02-28 RX ORDER — PHENTERMINE HYDROCHLORIDE 37.5 MG/1
TABLET ORAL
Qty: 30 TABLET | Refills: 0 | Status: SHIPPED | OUTPATIENT
Start: 2025-02-28 | End: 2025-03-23

## 2025-02-28 ASSESSMENT — ENCOUNTER SYMPTOMS
GASTROINTESTINAL NEGATIVE: 1
RESPIRATORY NEGATIVE: 1

## 2025-02-28 NOTE — PROGRESS NOTES
Chief Complaint   Patient presents with    Weight Management    1. Have you been to the ER, urgent care clinic since your last visit?  Hospitalized since your last visit?No    2. Have you seen or consulted any other health care providers outside of the [facility] since your last visit?  Include any pap smears or colon screening. No   [facility] Nurse Note:       CC: Weight Management    Jazzy Collins is a 49 y.o. female who is here for her f/up medical provider visit for the LCD program.       Did you have any problems adhering to the program since last visit? Yes  If yes, please explain: cravings for sweets     Since your last visit, have you experienced any complications? No    Have you received any other medical care since last visit? No  If yes, where and for what?     Have you had any change in your medications since your last visit? No If yes what?     Are you taking an anti-obesity medication? No If yes what and are you experiencing any side effects?      Would you still like to continue your current medication and dosage? No    BP Readings from Last 3 Encounters:   12/05/24 104/72   11/01/24 (!) 129/8        Eating Habits Over Last Week:    How many oz of sugar-free fluids are you consuming? 64    Did you consume your prescribed meal replacement regimen each day?  no    Physical Activity Routine:    Aerobic exercise: walking almost every day 2-5 miles    Resistance exercise: 0    
Thought content normal.         Judgment: Judgment normal.       No results found for this or any previous visit (from the past 672 hour(s)).    Assessment / Plan  Encounter Diagnoses   Name Primary?    Class 1 obesity without serious comorbidity with body mass index (BMI) of 30.0 to 30.9 in adult, unspecified obesity type Yes    Encounter for dietary counseling and surveillance     Encounter for weight loss counseling      1.  Weight management      Today, the patient is  Height: 160 cm (5' 3\") tall, Weight - Scale: 77.5 kg (170 lb 12.8 oz) lbs for a Body mass index is 30.26 kg/m².  Mrs. Collins continues to struggle with the LCD and physical activity. After further discussion, she has decided to pursue medication management. Patient will start phentermine. Side effects of the medication reviewed with the patient. Education also provided in her visit summary.    -Phentermine 37.5 mg po daily: First week take 1/2 tab every morning, second week take 1 whole tab every morning   -Start a low carb/fat/calorie, high protein diet  -Maintain consistent physically activity 2-3 times a week, walking or the gym; increase as tolerated  -Maintain a fluid intake of 64 ounces or more daily    Diet and Exercise:  Low Calorie Diet Plan. Totals per day: 1,000-1,200 calories, 80+ grams protein, 60 grams carbs. Patient instructed to access My [employer] for detailed instructions for dietary treatment plan, additional nutrition education videos, and logging daily consumption.  Exercise Plan: 150 minutes exercise weekly. Patient instructed to access [employer] to log daily exercise.    I have reviewed/discussed the above normal BMI with the patient.  I have recommended the following interventions: dietary management education, guidance, and counseling, encourage exercise, and monitor weight .         The primary encounter diagnosis was Class 1 obesity without serious comorbidity with body mass index (BMI) of

## 2025-02-28 NOTE — TELEPHONE ENCOUNTER
Patient requesting a call back regarding a medication that was to be sent to her pharmacy yesterday. Call back number [phone].

## 2025-03-28 ENCOUNTER — OFFICE VISIT (OUTPATIENT)
Age: 50
End: 2025-03-28
Payer: COMMERCIAL

## 2025-03-28 VITALS
HEIGHT: 63 IN | DIASTOLIC BLOOD PRESSURE: 82 MMHG | HEART RATE: 82 BPM | WEIGHT: 170 LBS | BODY MASS INDEX: 30.12 KG/M2 | SYSTOLIC BLOOD PRESSURE: 121 MMHG | OXYGEN SATURATION: 99 %

## 2025-03-28 DIAGNOSIS — Z71.3 ENCOUNTER FOR DIETARY COUNSELING AND SURVEILLANCE: ICD-10-CM

## 2025-03-28 DIAGNOSIS — Z79.899 FOLLOW-UP ENCOUNTER INVOLVING MEDICATION: ICD-10-CM

## 2025-03-28 DIAGNOSIS — E66.811 CLASS 1 OBESITY WITHOUT SERIOUS COMORBIDITY WITH BODY MASS INDEX (BMI) OF 30.0 TO 30.9 IN ADULT, UNSPECIFIED OBESITY TYPE: Primary | ICD-10-CM

## 2025-03-28 DIAGNOSIS — Z71.3 ENCOUNTER FOR WEIGHT LOSS COUNSELING: ICD-10-CM

## 2025-03-28 PROCEDURE — 99213 OFFICE O/P EST LOW 20 MIN: CPT

## 2025-03-28 RX ORDER — PHENTERMINE HYDROCHLORIDE 37.5 MG/1
37.5 TABLET ORAL
Qty: 30 TABLET | Refills: 2 | Status: SHIPPED | OUTPATIENT
Start: 2025-03-28 | End: 2025-06-26

## 2025-03-28 NOTE — PROGRESS NOTES
Chief Complaint   Patient presents with    Follow-up     [facility] Nurse Note:       CC: Weight Management    Jazzy Collins is a 49 y.o. female who is here for her f/up medical provider visit for the LCD program.       Did you have any problems adhering to the program since last visit? No  If yes, please explain:     Since your last visit, have you experienced any complications? No    Have you received any other medical care since last visit? No  If yes, where and for what?     Have you had any change in your medications since your last visit? No If yes what?     Are you taking an anti-obesity medication? Yes If yes what and are you experiencing any side effects?      Would you still like to continue your current medication and dosage? Yes    BP Readings from Last 3 Encounters:   03/28/25 121/82   02/27/25 104/70   12/05/24 104/72        Eating Habits Over Last Week:    How many oz of sugar-free fluids are you consuming? 64    Did you consume your prescribed meal replacement regimen each day? Yes    Physical Activity Routine:    Aerobic exercise: walking and classes at the [facility]    Resistance exercise: walking and classes at the [facility]

## 2025-04-01 ASSESSMENT — ENCOUNTER SYMPTOMS
RESPIRATORY NEGATIVE: 1
GASTROINTESTINAL NEGATIVE: 1

## 2025-04-01 NOTE — PROGRESS NOTES
[facility] Progress Note:   F/up Provider Visit    CC: Weight Management    Jazzy Collins is a 49 y.o. female who is here for her f/up medical provider visit for the Medication program. She was prescribed Phentermine her last visit.    Patient denies any unwanted side effects from the medication. She does notice that she is eating smaller meals. She has not eaten many baked foods lately. She is now attending various exercise classes at the [facility] four times a week in addition to walking. However, she feels as if she could have done a little more better with her weight loss amount. The patient would like to continue with the mediation.    See nurse note for subjective information.        3/28/2025     2:30 PM 12/5/2024     8:41 AM 11/1/2024     8:38 AM   Non-Surgical Weight Loss Tracker   Consult Date   11/1/2024   Initial Height 5' 3\" 5' 3\" 5' 3\"   Initial Weight 163 lbs 163 lbs 163 lbs 10 oz   Ideal Body Weight 128 lb 128 lb 128 lb   Initial BMI 28.9 28.87 28.98   Initial EBW 35 lb 35 lb 35 lb   Date 3/28/2025 12/5/2024    Weight 170 lb 165 lb    BMI 30.1 29.23    Weight Change since last Visit 5 lb 1 lb 6.4 oz    Weight Change since Initial Consult 7 lb 1 lb 6.4 oz    % EBWL -20% -4%    % Total Body Weight Loss  -4%          Arm: 12  Waist: 36  Thigh: 22    Body composition scanned to media    Down 4 lbs and 0 inches since last visit. Down 0 lbs (up 7 lbs) since starting program on 11/1/24.    Goal wt: 135 lbs  EKG due: 113 lbs  Ideal body weight: 52.4 kg (115 lb 8.3 oz)  Adjusted ideal body weight: 62.3 kg (137 lb 5 oz)  Body mass index is 30.11 kg/m².    History    Past Medical History:   Diagnosis Date    Acute iritis 10/2010    right eye    Bell's palsy 1/14/2011    Pain in the neck 10/27/2010     Past Surgical History:   Procedure Laterality Date    ENDOMETRIAL ABLATION      HEENT      gum grafting    TUBAL LIGATION       Current Outpatient Medications   Medication Sig Dispense Refill

## 2025-05-23 ENCOUNTER — OFFICE VISIT (OUTPATIENT)
Age: 50
End: 2025-05-23
Payer: COMMERCIAL

## 2025-05-23 ENCOUNTER — CLINICAL DOCUMENTATION (OUTPATIENT)
Age: 50
End: 2025-05-23

## 2025-05-23 ENCOUNTER — CLINICAL DOCUMENTATION (OUTPATIENT)
Facility: HOSPITAL | Age: 50
End: 2025-05-23

## 2025-05-23 VITALS
DIASTOLIC BLOOD PRESSURE: 64 MMHG | SYSTOLIC BLOOD PRESSURE: 112 MMHG | BODY MASS INDEX: 29.77 KG/M2 | TEMPERATURE: 96.8 F | HEIGHT: 63 IN | HEART RATE: 69 BPM | WEIGHT: 168 LBS | RESPIRATION RATE: 16 BRPM | OXYGEN SATURATION: 97 %

## 2025-05-23 DIAGNOSIS — Z71.3 ENCOUNTER FOR DIETARY COUNSELING AND SURVEILLANCE: ICD-10-CM

## 2025-05-23 DIAGNOSIS — Z86.39 HISTORY OF OBESITY: ICD-10-CM

## 2025-05-23 DIAGNOSIS — R11.2 NAUSEA AND VOMITING, UNSPECIFIED VOMITING TYPE: ICD-10-CM

## 2025-05-23 DIAGNOSIS — Z79.899 FOLLOW-UP ENCOUNTER INVOLVING MEDICATION: ICD-10-CM

## 2025-05-23 DIAGNOSIS — E66.3 OVERWEIGHT WITH BODY MASS INDEX (BMI) OF 29 TO 29.9 IN ADULT: Primary | ICD-10-CM

## 2025-05-23 DIAGNOSIS — Z71.3 ENCOUNTER FOR WEIGHT LOSS COUNSELING: ICD-10-CM

## 2025-05-23 PROCEDURE — 99213 OFFICE O/P EST LOW 20 MIN: CPT

## 2025-05-23 RX ORDER — ONDANSETRON 4 MG/1
4 TABLET, ORALLY DISINTEGRATING ORAL 3 TIMES DAILY PRN
Qty: 21 TABLET | Refills: 0 | Status: SHIPPED | OUTPATIENT
Start: 2025-05-23

## 2025-05-23 RX ORDER — SEMAGLUTIDE 0.5 MG/.5ML
0.5 INJECTION, SOLUTION SUBCUTANEOUS
Qty: 2 ML | Refills: 0 | Status: SHIPPED | OUTPATIENT
Start: 2025-05-23

## 2025-05-23 ASSESSMENT — ENCOUNTER SYMPTOMS
RESPIRATORY NEGATIVE: 1
GASTROINTESTINAL NEGATIVE: 1

## 2025-05-23 NOTE — PROGRESS NOTES
5/23/2025:    Patient was contacted and left a voicemail to call me to schedule a nutrition follow up appointment per Ana Escobar for assistance with her diet while on the [facility] program.   Patient was provided with my contact information.    Inna Sotelo MS RD

## 2025-05-23 NOTE — PROGRESS NOTES
Prior Authorization for  Wegovy 0.5 was submitted via CMM to insurance [employer] FEP.     [facility] KEY/CASE ID: [id number]  STATUS: Pending, waiting response from policy.  PATIENT NOTIFIED via N/A

## 2025-05-23 NOTE — PROGRESS NOTES
[facility] Nurse Note:       CC: Weight Management    Jazzy Collins is a 49 y.o. female who is here for her f/up medical provider visit for the Medication program.       Did you have any problems adhering to the program since last visit? No  If yes, please explain:     Since your last visit, have you experienced any complications? Yes, no good results from phentermine.    Have you received any other medical care since last visit? Yes  If yes, where and for what? PCP, dx with tendonitis was on steroid x 6 days.    Have you had any change in your medications since your last visit? No If yes what?     Are you taking an anti-obesity medication? Yes If yes what and are you experiencing any side effects?  insomnia    Would you still like to continue your current medication and dosage? No, discuss other options    BP Readings from Last 3 Encounters:   03/28/25 121/82   02/27/25 104/70   12/05/24 104/72        Eating Habits Over Last Week:    How many oz of sugar-free fluids are you consuming? At least 64 oz    Did you consume your prescribed meal replacement regimen each day? No    Physical Activity Routine:    Aerobic exercise: daily    Resistance exercise: daily   
General: No focal deficit present.      Mental Status: She is alert and oriented to person, place, and time. Mental status is at baseline.   Psychiatric:         Mood and Affect: Mood normal.         Behavior: Behavior normal.         Thought Content: Thought content normal.         Judgment: Judgment normal.       No results found for this or any previous visit (from the past 4 weeks).    Assessment / Plan  Encounter Diagnoses   Name Primary?    Overweight with body mass index (BMI) of 29 to 29.9 in adult Yes    History of obesity     Nausea and vomiting, unspecified vomiting type     Encounter for dietary counseling and surveillance     Encounter for weight loss counseling     Follow-up encounter involving medication      1.  Weight management      Today, the patient is  Height: 160 cm (5' 3\") tall, Weight - Scale: 76.2 kg (168 lb) lbs for a Body mass index is 29.76 kg/m².  Mrs. Collins continues consistent physical activity. She is actually enjoying it as well. Unfortunately, her weight loss journey is not going as she expected. The patient was encouraged to continue with exercise and good eating habits. Plan of care will move forward with a GLP-1.    Orlistat: Contraindicated as she has a history of vitamin D deficiency  Phentermine (Adipex-P): Contraindicated as patient cannot tolerate side effects: insomnia. Medication also ineffective  Diethylpropion: See phentermine  Phentermine-topiramate (Qysmia): See phentermine   Bupropion-naltrexone (Contrave): Patient defers and she has no specific eating behaviors    -Start Wegovy 0.5 mg SQ every week x 4 weeks  -Continue exercise/dance classes 4 days a week, increase as tolerated  -Continue a low carb/fat/calorie, high protein diet  -Continue to maintain a fluid intake of 64 ounces or more daily    Diet and Exercise:  Low Calorie Diet Plan. Totals per day: 1,000-1,200 calories, 80+ grams protein, 60 grams carbs. Patient instructed to access My Healthy Journey digital

## 2025-05-28 DIAGNOSIS — E66.3 OVERWEIGHT WITH BODY MASS INDEX (BMI) OF 29 TO 29.9 IN ADULT: Primary | ICD-10-CM

## 2025-05-28 DIAGNOSIS — Z86.39 HISTORY OF OBESITY: ICD-10-CM

## 2025-05-28 RX ORDER — SEMAGLUTIDE 1 MG/.5ML
1 INJECTION, SOLUTION SUBCUTANEOUS
Qty: 2 ML | Refills: 0 | Status: SHIPPED | OUTPATIENT
Start: 2025-05-28

## 2025-06-02 ENCOUNTER — TELEPHONE (OUTPATIENT)
Age: 50
End: 2025-06-02

## 2025-06-02 NOTE — TELEPHONE ENCOUNTER
Patient wants appt for help with how to use wegovy shot. Patient is requesting appointment be on a wed

## 2025-06-24 ENCOUNTER — CLINICAL DOCUMENTATION (OUTPATIENT)
Facility: HOSPITAL | Age: 50
End: 2025-06-24

## 2025-06-24 ENCOUNTER — OFFICE VISIT (OUTPATIENT)
Age: 50
End: 2025-06-24
Payer: COMMERCIAL

## 2025-06-24 DIAGNOSIS — Z79.899 FOLLOW-UP ENCOUNTER INVOLVING MEDICATION: ICD-10-CM

## 2025-06-24 DIAGNOSIS — E66.3 OVERWEIGHT WITH BODY MASS INDEX (BMI) OF 29 TO 29.9 IN ADULT: Primary | ICD-10-CM

## 2025-06-24 DIAGNOSIS — Z86.39 HISTORY OF OBESITY: ICD-10-CM

## 2025-06-24 DIAGNOSIS — Z71.3 ENCOUNTER FOR WEIGHT LOSS COUNSELING: ICD-10-CM

## 2025-06-24 DIAGNOSIS — Z71.3 ENCOUNTER FOR DIETARY COUNSELING AND SURVEILLANCE: ICD-10-CM

## 2025-06-24 PROCEDURE — 99213 OFFICE O/P EST LOW 20 MIN: CPT

## 2025-06-24 NOTE — PROGRESS NOTES
6/24/25:  Patient was unable to be seen.  Patient was originally scheduled to see me at 2 pm and the NP at 3 pm.  She rescheduled to see the NP to 1:40 pm, which missed half of her time slot with me.  Patient was given my contact information to reschedule this visit.    Inna Sotelo MS RD

## 2025-06-30 DIAGNOSIS — E66.3 OVERWEIGHT WITH BODY MASS INDEX (BMI) OF 29 TO 29.9 IN ADULT: Primary | ICD-10-CM

## 2025-06-30 DIAGNOSIS — Z86.39 HISTORY OF OBESITY: ICD-10-CM

## 2025-06-30 RX ORDER — DIETHYLPROPION HYDROCHLORIDE 75 MG/1
1 TABLET, EXTENDED RELEASE ORAL DAILY
Qty: 30 TABLET | Refills: 0 | Status: SHIPPED | OUTPATIENT
Start: 2025-06-30 | End: 2025-07-30

## 2025-07-07 VITALS
SYSTOLIC BLOOD PRESSURE: 102 MMHG | HEART RATE: 82 BPM | BODY MASS INDEX: 28.7 KG/M2 | OXYGEN SATURATION: 99 % | HEIGHT: 63 IN | WEIGHT: 162 LBS | RESPIRATION RATE: 18 BRPM | DIASTOLIC BLOOD PRESSURE: 74 MMHG

## 2025-07-07 ASSESSMENT — ENCOUNTER SYMPTOMS
GASTROINTESTINAL NEGATIVE: 1
RESPIRATORY NEGATIVE: 1

## 2025-07-07 NOTE — PROGRESS NOTES
Chief Complaint   Patient presents with    Follow-up     [facility]     1. Have you been to the ER, urgent care clinic since your last visit?  Hospitalized since your last visit?No    2. Have you seen or consulted any other health care providers outside of the [facility] since your last visit?  Include any pap smears or colon screening. No    [facility] Nurse Note:       CC: Weight Management    Jazzy Collins is a 49 y.o. female who is here for her f/up medical provider visit for the Medication program.       Did you have any problems adhering to the program since last visit? No  If yes, please explain:     Since your last visit, have you experienced any complications? Yes  Sulfa burps,headache,restless  Have you received any other medical care since last visit? No  If yes, where and for what?     Have you had any change in your medications since your last visit? No If yes what?     Are you taking an anti-obesity medication? Yes If yes what and are you experiencing any side effects? wegovy      Would you still like to continue your current medication and dosage? Yes but can't afford     BP Readings from Last 3 Encounters:   05/23/25 112/64   03/28/25 121/82   02/27/25 104/70        Eating Habits Over Last Week:    How many oz of sugar-free fluids are you consuming? 60oz    Did you consume your prescribed meal replacement regimen each day? No    Physical Activity Routine:    Aerobic exercise: 1 hour fitness classes     Resistance exercise: 1 hour fitness classes     
  Pulmonary:      Effort: Pulmonary effort is normal.   Musculoskeletal:         General: Normal range of motion.      Cervical back: Normal range of motion and neck supple.   Neurological:      General: No focal deficit present.      Mental Status: She is alert and oriented to person, place, and time. Mental status is at baseline.   Psychiatric:         Mood and Affect: Mood normal.         Behavior: Behavior normal.         Thought Content: Thought content normal.         Judgment: Judgment normal.       No results found for this or any previous visit (from the past 4 weeks).    Assessment / Plan  Encounter Diagnoses   Name Primary?    Overweight with body mass index (BMI) of 29 to 29.9 in adult Yes    History of obesity     Encounter for weight loss counseling     Encounter for dietary counseling and surveillance     Follow-up encounter involving medication      1.  Weight management      Today, the patient is  Height: 160 cm (5' 3\") tall, Weight - Scale: 73.5 kg (162 lb) lbs for a Body mass index is 28.7 kg/m².  Mrs. Collins was encouraged to continue with exercise and good eating habits. She will notify provider of what medication she would like to take for weight loss.    -Continue exercise/dance classes 4 days a week, increase as tolerated  -Continue a low carb/fat/calorie, high protein diet  -Continue to maintain a fluid intake of 64 ounces or more daily    Diet and Exercise:  Low Calorie Diet Plan. Totals per day: 1,000-1,200 calories, 80+ grams protein, 60 grams carbs. Patient instructed to access My [employer] for detailed instructions for dietary treatment plan, additional nutrition education videos, and logging daily consumption.  Exercise Plan: 150 minutes exercise weekly. Patient instructed to access My Healthy Journey to log daily exercise.    I have reviewed/discussed the above normal BMI with the patient.  I have recommended the following interventions: dietary management

## 2025-07-22 ENCOUNTER — OFFICE VISIT (OUTPATIENT)
Age: 50
End: 2025-07-22
Payer: COMMERCIAL

## 2025-07-22 VITALS
HEIGHT: 63 IN | OXYGEN SATURATION: 98 % | SYSTOLIC BLOOD PRESSURE: 105 MMHG | DIASTOLIC BLOOD PRESSURE: 75 MMHG | WEIGHT: 160.8 LBS | BODY MASS INDEX: 28.49 KG/M2 | RESPIRATION RATE: 18 BRPM | HEART RATE: 95 BPM

## 2025-07-22 DIAGNOSIS — Z71.3 ENCOUNTER FOR DIETARY COUNSELING AND SURVEILLANCE: ICD-10-CM

## 2025-07-22 DIAGNOSIS — Z71.3 ENCOUNTER FOR WEIGHT LOSS COUNSELING: ICD-10-CM

## 2025-07-22 DIAGNOSIS — Z86.39 HISTORY OF OBESITY: ICD-10-CM

## 2025-07-22 DIAGNOSIS — E66.3 OVERWEIGHT WITH BODY MASS INDEX (BMI) OF 28 TO 28.9 IN ADULT: Primary | ICD-10-CM

## 2025-07-22 DIAGNOSIS — Z79.899 FOLLOW-UP ENCOUNTER INVOLVING MEDICATION: ICD-10-CM

## 2025-07-22 PROCEDURE — 99213 OFFICE O/P EST LOW 20 MIN: CPT

## 2025-07-22 NOTE — PROGRESS NOTES
Chief Complaint   Patient presents with    Follow-up     [facility]    1. Have you been to the ER, urgent care clinic since your last visit?  Hospitalized since your last visit?No    2. Have you seen or consulted any other health care providers outside of the [facility] since your last visit?  Include any pap smears or colon screening. No    [facility] Nurse Note:       CC: Weight Management    Jazzy Collins is a 49 y.o. female who is here for her f/up medical provider visit for the Medication program.       Did you have any problems adhering to the program since last visit? No  If yes, please explain:     Since your last visit, have you experienced any complications? No    Have you received any other medical care since last visit? NO If yes, where and for what?     Have you had any change in your medications since your last visit? No If yes what?     Are you taking an anti-obesity medication? Yes If yes what and are you experiencing any side effects?      Would you still like to continue your current medication and dosage? Yes    BP Readings from Last 3 Encounters:   06/24/25 102/74   05/23/25 112/64   03/28/25 121/82        Eating Habits Over Last Week:    How many oz of sugar-free fluids are you consuming? 80    Did you consume your prescribed meal replacement regimen each day? No    Physical Activity Routine:    Aerobic exercise: 5 X WEEKLY    Resistance exercise: 5 X WEEKLY

## 2025-07-24 DIAGNOSIS — E66.3 OVERWEIGHT WITH BODY MASS INDEX (BMI) OF 28 TO 28.9 IN ADULT: Primary | ICD-10-CM

## 2025-07-24 RX ORDER — SEMAGLUTIDE 1.7 MG/.75ML
1.7 INJECTION, SOLUTION SUBCUTANEOUS
Qty: 3 ML | Refills: 0 | Status: SHIPPED | OUTPATIENT
Start: 2025-07-24

## 2025-07-28 ASSESSMENT — ENCOUNTER SYMPTOMS
RESPIRATORY NEGATIVE: 1
GASTROINTESTINAL NEGATIVE: 1

## 2025-07-28 NOTE — PROGRESS NOTES
[facility] Progress Note:   F/up Provider Visit    CC: Weight Management    Jazzy Collins is a 49 y.o. female who is here for her f/up medical provider visit for the Medication program. She was prescribed Diethylpropion her last visit which she purchased out of pocket.    Patient shares that she did not notice much appetite suppression. She also has been busy lately. She maintains consistency with exercise classes at the [facility] four times a week in addition to walking daily and well balanced meals.      See nurse note for subjective information.        7/22/2025     2:25 PM 6/24/2025     1:51 PM 3/28/2025     2:30 PM 12/5/2024     8:41 AM 11/1/2024     8:38 AM   Non-Surgical Weight Loss Tracker   Consult Date     11/1/2024   Initial Height 5' 3\" 5' 3\" 5' 3\" 5' 3\" 5' 3\"   Initial Weight 163 lbs 163 lbs 163 lbs 163 lbs 163 lbs 10 oz   Ideal Body Weight 128 lb 128 lb 128 lb 128 lb 128 lb   Initial BMI 28.9 28.9 28.9 28.87 28.98   Initial EBW 35 lb 35 lb 35 lb 35 lb 35 lb   Date 7/22/2025 6/24/2025 3/28/2025 12/5/2024    Weight 160 lb 12.8 oz 165 lb 170 lb 165 lb    BMI 28.5 29.2 30.1 29.23    Weight Change since last Visit -4 lb 3.2 oz -5 lb 5 lb 1 lb 6.4 oz    Weight Change since Initial Consult -2 lb 3.2 oz 2 lb 7 lb 1 lb 6.4 oz    % EBWL 6% -6% -20% -4%    % Total Body Weight Loss  1% -1% -4%     Non-Surgical Subsequent Eval Fat Mass  53 lb       Non-Surgical Subsequent Eval % Body Fat  33.6%       Non-Surgical Subsequent Eval Fat Free Mass  105 lb 4.8 oz       Non-Surgical Subsequent Eval Water Weight  75 lb 9.6 oz       Non-Surgical Subsequent Eval Muscle Mass  29 lb 9.6 oz            Arm: 11  Waist: 33  Thigh: 21    Body composition scanned to media    Down 5 lbs and 3.5 inches since last visit. Down 3 lb since starting program on 11/1/24 (corrected).    Goal wt: 135 lbs  EKG due: 113 lbs  Ideal body weight: 52.4 kg (115 lb 8.3 oz)  Adjusted ideal body weight: 60.6 kg (133 lb 10.1 oz)  Body